# Patient Record
Sex: FEMALE | Race: WHITE | NOT HISPANIC OR LATINO | Employment: UNEMPLOYED | URBAN - METROPOLITAN AREA
[De-identification: names, ages, dates, MRNs, and addresses within clinical notes are randomized per-mention and may not be internally consistent; named-entity substitution may affect disease eponyms.]

---

## 2017-05-04 ENCOUNTER — TRANSCRIBE ORDERS (OUTPATIENT)
Dept: OBSTETRICS AND GYNECOLOGY | Facility: HOSPITAL | Age: 27
End: 2017-05-04

## 2017-05-04 DIAGNOSIS — I50.9 CONGESTIVE HEART FAILURE OF UNKNOWN ETIOLOGY (HCC): Primary | ICD-10-CM

## 2017-05-08 ENCOUNTER — APPOINTMENT (OUTPATIENT)
Dept: WOMENS IMAGING | Facility: HOSPITAL | Age: 27
End: 2017-05-08

## 2017-05-17 ENCOUNTER — APPOINTMENT (OUTPATIENT)
Dept: WOMENS IMAGING | Facility: HOSPITAL | Age: 27
End: 2017-05-17

## 2017-05-24 ENCOUNTER — APPOINTMENT (OUTPATIENT)
Dept: WOMENS IMAGING | Facility: HOSPITAL | Age: 27
End: 2017-05-24

## 2017-06-05 LAB
EXTERNAL ABO GROUPING: NORMAL
EXTERNAL ANTIBODY SCREEN: NEGATIVE
EXTERNAL RH FACTOR: NEGATIVE
EXTERNAL RUBELLA QUALITATIVE: NORMAL
EXTERNAL SYPHILIS RPR SCREEN: NORMAL
HIV1 P24 AG SERPL QL IA: NORMAL

## 2017-07-03 ENCOUNTER — TRANSCRIBE ORDERS (OUTPATIENT)
Dept: WOMENS IMAGING | Facility: HOSPITAL | Age: 27
End: 2017-07-03

## 2017-07-03 DIAGNOSIS — Z86.79: Primary | ICD-10-CM

## 2017-07-10 ENCOUNTER — APPOINTMENT (OUTPATIENT)
Dept: WOMENS IMAGING | Facility: HOSPITAL | Age: 27
End: 2017-07-10

## 2017-09-15 ENCOUNTER — TRANSCRIBE ORDERS (OUTPATIENT)
Dept: OBSTETRICS AND GYNECOLOGY | Facility: HOSPITAL | Age: 27
End: 2017-09-15

## 2017-09-15 DIAGNOSIS — I50.9 CONGESTIVE HEART FAILURE, UNSPECIFIED: Primary | ICD-10-CM

## 2017-09-25 ENCOUNTER — APPOINTMENT (OUTPATIENT)
Dept: WOMENS IMAGING | Facility: HOSPITAL | Age: 27
End: 2017-09-25

## 2017-10-09 ENCOUNTER — APPOINTMENT (OUTPATIENT)
Dept: WOMENS IMAGING | Facility: HOSPITAL | Age: 27
End: 2017-10-09

## 2017-10-26 LAB — EXTERNAL GROUP B STREP ANTIGEN: POSITIVE

## 2017-11-08 ENCOUNTER — ANESTHESIA EVENT (OUTPATIENT)
Dept: LABOR AND DELIVERY | Facility: HOSPITAL | Age: 27
End: 2017-11-08

## 2017-11-08 ENCOUNTER — ANESTHESIA (OUTPATIENT)
Dept: LABOR AND DELIVERY | Facility: HOSPITAL | Age: 27
End: 2017-11-08

## 2017-11-08 ENCOUNTER — HOSPITAL ENCOUNTER (INPATIENT)
Facility: HOSPITAL | Age: 27
LOS: 2 days | Discharge: HOME | End: 2017-11-10
Attending: OBSTETRICS & GYNECOLOGY | Admitting: OBSTETRICS & GYNECOLOGY

## 2017-11-08 DIAGNOSIS — Z98.891 H/O: CESAREAN SECTION: Primary | ICD-10-CM

## 2017-11-08 LAB
6-ACETYL MORPHINE: NEGATIVE
ABO GROUP BLD: NORMAL
AMPHET+METHAMPHET UR QL: NEGATIVE
BARBITURATES UR QL SCN: NEGATIVE
BENZODIAZ UR QL SCN: NEGATIVE
BLD GP AB SCN SERPL QL: NEGATIVE
BUPRENORPHINE SERPL-MCNC: POSITIVE NG/ML
CANNABINOIDS SERPL QL: NEGATIVE
COCAINE UR QL: NEGATIVE
DEPRECATED RDW RBC AUTO: 43.7 FL (ref 37–54)
ERYTHROCYTE [DISTWIDTH] IN BLOOD BY AUTOMATED COUNT: 13.9 % (ref 11.5–14.5)
HCT VFR BLD AUTO: 35.4 % (ref 37–47)
HGB BLD-MCNC: 12 G/DL (ref 12–16)
MCH RBC QN AUTO: 29.6 PG (ref 27–33)
MCHC RBC AUTO-ENTMCNC: 33.9 G/DL (ref 33–37)
MCV RBC AUTO: 87.4 FL (ref 80–94)
METHADONE UR QL SCN: NEGATIVE
OPIATES UR QL: NEGATIVE
OXYCODONE UR QL SCN: NEGATIVE
PCP UR QL SCN: NEGATIVE
PLATELET # BLD AUTO: 182 10*3/MM3 (ref 130–400)
PMV BLD AUTO: 11.4 FL (ref 6–10)
RBC # BLD AUTO: 4.05 10*6/MM3 (ref 4.2–5.4)
RH BLD: NEGATIVE
WBC NRBC COR # BLD: 13.71 10*3/MM3 (ref 4.5–12.5)

## 2017-11-08 PROCEDURE — 80307 DRUG TEST PRSMV CHEM ANLYZR: CPT | Performed by: OBSTETRICS & GYNECOLOGY

## 2017-11-08 PROCEDURE — 88302 TISSUE EXAM BY PATHOLOGIST: CPT | Performed by: OBSTETRICS & GYNECOLOGY

## 2017-11-08 PROCEDURE — 25010000003 CEFAZOLIN PER 500 MG: Performed by: ANESTHESIOLOGY

## 2017-11-08 PROCEDURE — 25010000002 KETOROLAC TROMETHAMINE PER 15 MG

## 2017-11-08 PROCEDURE — C1751 CATH, INF, PER/CENT/MIDLINE: HCPCS

## 2017-11-08 PROCEDURE — 86850 RBC ANTIBODY SCREEN: CPT | Performed by: OBSTETRICS & GYNECOLOGY

## 2017-11-08 PROCEDURE — 25010000002 MORPHINE PER 10 MG: Performed by: ANESTHESIOLOGY

## 2017-11-08 PROCEDURE — 85027 COMPLETE CBC AUTOMATED: CPT | Performed by: OBSTETRICS & GYNECOLOGY

## 2017-11-08 PROCEDURE — 25010000002 MIDAZOLAM PER 1 MG: Performed by: ANESTHESIOLOGY

## 2017-11-08 PROCEDURE — 59025 FETAL NON-STRESS TEST: CPT

## 2017-11-08 PROCEDURE — 86900 BLOOD TYPING SEROLOGIC ABO: CPT | Performed by: OBSTETRICS & GYNECOLOGY

## 2017-11-08 PROCEDURE — 94799 UNLISTED PULMONARY SVC/PX: CPT

## 2017-11-08 PROCEDURE — 0UB70ZZ EXCISION OF BILATERAL FALLOPIAN TUBES, OPEN APPROACH: ICD-10-PCS | Performed by: OBSTETRICS & GYNECOLOGY

## 2017-11-08 PROCEDURE — 86901 BLOOD TYPING SEROLOGIC RH(D): CPT | Performed by: OBSTETRICS & GYNECOLOGY

## 2017-11-08 RX ORDER — LIDOCAINE HYDROCHLORIDE 10 MG/ML
5 INJECTION, SOLUTION INFILTRATION; PERINEURAL AS NEEDED
Status: DISCONTINUED | OUTPATIENT
Start: 2017-11-08 | End: 2017-11-08 | Stop reason: HOSPADM

## 2017-11-08 RX ORDER — SODIUM CHLORIDE 0.9 % (FLUSH) 0.9 %
1-10 SYRINGE (ML) INJECTION AS NEEDED
Status: DISCONTINUED | OUTPATIENT
Start: 2017-11-08 | End: 2017-11-10 | Stop reason: HOSPADM

## 2017-11-08 RX ORDER — SODIUM CHLORIDE, SODIUM LACTATE, POTASSIUM CHLORIDE, CALCIUM CHLORIDE 600; 310; 30; 20 MG/100ML; MG/100ML; MG/100ML; MG/100ML
125 INJECTION, SOLUTION INTRAVENOUS CONTINUOUS
Status: DISCONTINUED | OUTPATIENT
Start: 2017-11-08 | End: 2017-11-08

## 2017-11-08 RX ORDER — DIPHENHYDRAMINE HCL 25 MG
25 CAPSULE ORAL EVERY 4 HOURS PRN
Status: DISCONTINUED | OUTPATIENT
Start: 2017-11-08 | End: 2017-11-10 | Stop reason: HOSPADM

## 2017-11-08 RX ORDER — OXYTOCIN/RINGER'S LACTATE 20/1000 ML
125 PLASTIC BAG, INJECTION (ML) INTRAVENOUS CONTINUOUS
Status: DISCONTINUED | OUTPATIENT
Start: 2017-11-08 | End: 2017-11-09

## 2017-11-08 RX ORDER — NALOXONE HCL 0.4 MG/ML
0.1 VIAL (ML) INJECTION
Status: ACTIVE | OUTPATIENT
Start: 2017-11-08 | End: 2017-11-09

## 2017-11-08 RX ORDER — MIDAZOLAM HYDROCHLORIDE 1 MG/ML
INJECTION INTRAMUSCULAR; INTRAVENOUS AS NEEDED
Status: DISCONTINUED | OUTPATIENT
Start: 2017-11-08 | End: 2017-11-08 | Stop reason: SURG

## 2017-11-08 RX ORDER — DIPHENHYDRAMINE HYDROCHLORIDE 50 MG/ML
25 INJECTION INTRAMUSCULAR; INTRAVENOUS EVERY 4 HOURS PRN
Status: DISCONTINUED | OUTPATIENT
Start: 2017-11-08 | End: 2017-11-10 | Stop reason: HOSPADM

## 2017-11-08 RX ORDER — ACETAMINOPHEN 160 MG/5ML
650 SOLUTION ORAL EVERY 4 HOURS PRN
Status: DISCONTINUED | OUTPATIENT
Start: 2017-11-08 | End: 2017-11-10 | Stop reason: HOSPADM

## 2017-11-08 RX ORDER — DOCUSATE SODIUM 100 MG/1
100 CAPSULE, LIQUID FILLED ORAL DAILY
Status: DISCONTINUED | OUTPATIENT
Start: 2017-11-09 | End: 2017-11-10 | Stop reason: HOSPADM

## 2017-11-08 RX ORDER — NICOTINE 21 MG/24HR
1 PATCH, TRANSDERMAL 24 HOURS TRANSDERMAL EVERY 24 HOURS
Status: DISCONTINUED | OUTPATIENT
Start: 2017-11-08 | End: 2017-11-10 | Stop reason: HOSPADM

## 2017-11-08 RX ORDER — MAGNESIUM HYDROXIDE 1200 MG/15ML
3000 LIQUID ORAL ONCE AS NEEDED
Status: DISCONTINUED | OUTPATIENT
Start: 2017-11-08 | End: 2017-11-08 | Stop reason: HOSPADM

## 2017-11-08 RX ORDER — PRENATAL VIT/IRON FUM/FOLIC AC 27MG-0.8MG
1 TABLET ORAL DAILY
Status: DISCONTINUED | OUTPATIENT
Start: 2017-11-09 | End: 2017-11-10 | Stop reason: HOSPADM

## 2017-11-08 RX ORDER — KETOROLAC TROMETHAMINE 30 MG/ML
INJECTION, SOLUTION INTRAMUSCULAR; INTRAVENOUS
Status: COMPLETED
Start: 2017-11-08 | End: 2017-11-08

## 2017-11-08 RX ORDER — SIMETHICONE 80 MG
80 TABLET,CHEWABLE ORAL 4 TIMES DAILY PRN
Status: DISCONTINUED | OUTPATIENT
Start: 2017-11-08 | End: 2017-11-10 | Stop reason: HOSPADM

## 2017-11-08 RX ORDER — OXYTOCIN/RINGER'S LACTATE 20/1000 ML
2 PLASTIC BAG, INJECTION (ML) INTRAVENOUS CONTINUOUS
Status: DISCONTINUED | OUTPATIENT
Start: 2017-11-08 | End: 2017-11-08

## 2017-11-08 RX ORDER — OXYTOCIN/RINGER'S LACTATE 20/1000 ML
PLASTIC BAG, INJECTION (ML) INTRAVENOUS
Status: DISCONTINUED
Start: 2017-11-08 | End: 2017-11-10 | Stop reason: HOSPADM

## 2017-11-08 RX ORDER — PRENATAL VIT/IRON FUM/FOLIC AC 27MG-0.8MG
1 TABLET ORAL DAILY
COMMUNITY

## 2017-11-08 RX ORDER — BUPRENORPHINE HYDROCHLORIDE 8 MG/1
8 TABLET SUBLINGUAL EVERY 12 HOURS SCHEDULED
COMMUNITY

## 2017-11-08 RX ORDER — SODIUM CHLORIDE 0.9 % (FLUSH) 0.9 %
10 SYRINGE (ML) INJECTION EVERY 12 HOURS SCHEDULED
Status: DISCONTINUED | OUTPATIENT
Start: 2017-11-08 | End: 2017-11-10 | Stop reason: HOSPADM

## 2017-11-08 RX ORDER — BISACODYL 5 MG/1
10 TABLET, DELAYED RELEASE ORAL DAILY PRN
Status: DISCONTINUED | OUTPATIENT
Start: 2017-11-08 | End: 2017-11-10 | Stop reason: HOSPADM

## 2017-11-08 RX ORDER — MISOPROSTOL 100 UG/1
800 TABLET ORAL AS NEEDED
Status: DISCONTINUED | OUTPATIENT
Start: 2017-11-08 | End: 2017-11-08 | Stop reason: HOSPADM

## 2017-11-08 RX ORDER — SODIUM CHLORIDE, SODIUM LACTATE, POTASSIUM CHLORIDE, CALCIUM CHLORIDE 600; 310; 30; 20 MG/100ML; MG/100ML; MG/100ML; MG/100ML
125 INJECTION, SOLUTION INTRAVENOUS CONTINUOUS
Status: DISCONTINUED | OUTPATIENT
Start: 2017-11-08 | End: 2017-11-09

## 2017-11-08 RX ORDER — MORPHINE SULFATE 1 MG/ML
INJECTION, SOLUTION EPIDURAL; INTRATHECAL; INTRAVENOUS AS NEEDED
Status: DISCONTINUED | OUTPATIENT
Start: 2017-11-08 | End: 2017-11-08 | Stop reason: SURG

## 2017-11-08 RX ORDER — SODIUM CHLORIDE 0.9 % (FLUSH) 0.9 %
1-10 SYRINGE (ML) INJECTION AS NEEDED
Status: DISCONTINUED | OUTPATIENT
Start: 2017-11-08 | End: 2017-11-08 | Stop reason: HOSPADM

## 2017-11-08 RX ORDER — METHYLERGONOVINE MALEATE 0.2 MG/ML
200 INJECTION INTRAVENOUS ONCE AS NEEDED
Status: DISCONTINUED | OUTPATIENT
Start: 2017-11-08 | End: 2017-11-08 | Stop reason: HOSPADM

## 2017-11-08 RX ORDER — HYDROCODONE BITARTRATE AND ACETAMINOPHEN 5; 325 MG/1; MG/1
1 TABLET ORAL EVERY 4 HOURS PRN
Status: DISCONTINUED | OUTPATIENT
Start: 2017-11-08 | End: 2017-11-10 | Stop reason: HOSPADM

## 2017-11-08 RX ORDER — SODIUM CHLORIDE 0.9 % (FLUSH) 0.9 %
10 SYRINGE (ML) INJECTION AS NEEDED
Status: DISCONTINUED | OUTPATIENT
Start: 2017-11-08 | End: 2017-11-10 | Stop reason: HOSPADM

## 2017-11-08 RX ORDER — BUPRENORPHINE 2 MG/1
8 TABLET SUBLINGUAL EVERY 12 HOURS SCHEDULED
Status: DISCONTINUED | OUTPATIENT
Start: 2017-11-08 | End: 2017-11-10 | Stop reason: HOSPADM

## 2017-11-08 RX ORDER — ONDANSETRON 2 MG/ML
4 INJECTION INTRAMUSCULAR; INTRAVENOUS ONCE AS NEEDED
Status: DISCONTINUED | OUTPATIENT
Start: 2017-11-08 | End: 2017-11-10 | Stop reason: HOSPADM

## 2017-11-08 RX ORDER — PROMETHAZINE HYDROCHLORIDE 25 MG/1
12.5 TABLET ORAL EVERY 4 HOURS PRN
Status: DISCONTINUED | OUTPATIENT
Start: 2017-11-08 | End: 2017-11-10 | Stop reason: HOSPADM

## 2017-11-08 RX ORDER — KETOROLAC TROMETHAMINE 30 MG/ML
30 INJECTION, SOLUTION INTRAMUSCULAR; INTRAVENOUS EVERY 6 HOURS PRN
Status: ACTIVE | OUTPATIENT
Start: 2017-11-08 | End: 2017-11-09

## 2017-11-08 RX ORDER — HYDROXYZINE 50 MG/1
50 TABLET, FILM COATED ORAL EVERY 6 HOURS PRN
Status: DISCONTINUED | OUTPATIENT
Start: 2017-11-08 | End: 2017-11-10 | Stop reason: HOSPADM

## 2017-11-08 RX ORDER — CARBOPROST TROMETHAMINE 250 UG/ML
250 INJECTION, SOLUTION INTRAMUSCULAR AS NEEDED
Status: DISCONTINUED | OUTPATIENT
Start: 2017-11-08 | End: 2017-11-08 | Stop reason: HOSPADM

## 2017-11-08 RX ORDER — ONDANSETRON 4 MG/1
4 TABLET, FILM COATED ORAL EVERY 8 HOURS PRN
Status: DISCONTINUED | OUTPATIENT
Start: 2017-11-08 | End: 2017-11-10 | Stop reason: HOSPADM

## 2017-11-08 RX ORDER — BUPRENORPHINE 2 MG/1
8 TABLET SUBLINGUAL
Status: ON HOLD | COMMUNITY
End: 2017-11-08

## 2017-11-08 RX ADMIN — KETOROLAC TROMETHAMINE 30 MG: 30 INJECTION, SOLUTION INTRAMUSCULAR; INTRAVENOUS at 17:51

## 2017-11-08 RX ADMIN — NICOTINE 1 PATCH: 14 PATCH TRANSDERMAL at 22:11

## 2017-11-08 RX ADMIN — MIDAZOLAM HYDROCHLORIDE 4 MG: 1 INJECTION, SOLUTION INTRAMUSCULAR; INTRAVENOUS at 16:22

## 2017-11-08 RX ADMIN — MORPHINE SULFATE 4.5 MG: 1 INJECTION, SOLUTION EPIDURAL; INTRATHECAL; INTRAVENOUS at 16:16

## 2017-11-08 RX ADMIN — SODIUM CHLORIDE, POTASSIUM CHLORIDE, SODIUM LACTATE AND CALCIUM CHLORIDE 125 ML/HR: 600; 310; 30; 20 INJECTION, SOLUTION INTRAVENOUS at 15:41

## 2017-11-08 RX ADMIN — SODIUM CHLORIDE, POTASSIUM CHLORIDE, SODIUM LACTATE AND CALCIUM CHLORIDE 1000 ML: 600; 310; 30; 20 INJECTION, SOLUTION INTRAVENOUS at 15:09

## 2017-11-08 RX ADMIN — MORPHINE SULFATE 0.5 MG: 1 INJECTION, SOLUTION EPIDURAL; INTRATHECAL; INTRAVENOUS at 16:14

## 2017-11-08 RX ADMIN — KETOROLAC TROMETHAMINE 30 MG: 30 INJECTION, SOLUTION INTRAMUSCULAR at 17:51

## 2017-11-08 RX ADMIN — BUPRENORPHINE HCL 8 MG: 2 TABLET SUBLINGUAL at 21:10

## 2017-11-08 RX ADMIN — CEFAZOLIN SODIUM 2 G: 2 SOLUTION INTRAVENOUS at 15:59

## 2017-11-08 NOTE — H&P
HISTORY    Chief complaint  Term pregnancy, previous  section, desire for sterilization    History of present illness  This is a 27 year old patient who is admitted to the hospital with a term pregnancy.  She has had a previous  section and she wishes to have tubal sterilization.    Past medical, surgical, obstetrical history  See attached prenatal record    Family history  See attached prenatal record    Social history  See attached prenatal record    Functional inquiry  See attached prenatal record                             PHYSICAL EXAMINATION    General  In no acute distress    HEENT  Throat and ears are clear, no masses or nodes were palpable    CVR  Heart sounds are normal with no murmurs, chest is clear to IPPA    GI/  Abdomen is soft with no masses tenderness or organomegaly.  Uterine size is commensurate with her dates.  Cervix is as noted elsewhere in chart    MS  No gross bone or joint abnormalities.                                        IMPRESSION/TREATMENT PLAN  Term pregnancy, previous  section, desire for sterilization    Repeat low segment transverse  section with tubal sterilization.    I explained that when we do a  section, we usually use a regional anesthetic.  We will prep and drape her in the usual fashion.  A catheter will be put in her bladder.  We will make her incision, do the  section, and closed her with subcuticular Vicryl and Steri-Strips.  I explained that she will be in the hospital about 2 days.  She understands that the risks and complications of  section include but are not limited to risk of anesthesia, bleeding, infection, damage to the bowels, damage to the ureters and bladder, surgery to repair anything damage, and dehiscence of her incision.  She has no further questions, and she is informed, able to give informed consent.    I explained that when we do a tubal sterilization  at  section, we draw up a loop of fallopian tube, suture ligate this with plain suture, cut off the end of the loop, and cauterize both ends.  She understands that the suture will dissolve, the ends of the tube will scar shut, so that sperm cannot get up and eggs cannot get down.  She does understand also that tubal sterilization is extremely effective in preventing pregnancy, but nothing is perfect and there is a small chance of pregnancy afterwards.  She understands and accepts that.

## 2017-11-08 NOTE — ANESTHESIA POSTPROCEDURE EVALUATION
Patient: Mitchell Jay    Procedure Summary     Date Anesthesia Start Anesthesia Stop Room / Location    17 1540 1630  [facility] /  [facility]       Procedure Diagnosis Surgeon Provider     SECTION REPEAT WITH TUBAL (Bilateral Abdomen) (Repeat CesareanSection with a Bilateral tubal ligation) MD Justin Peraza MD          Anesthesia Type: spinal  Last vitals  BP   122/77 (17 1655)   Temp   97.6 °F (36.4 °C) (17 1640)   Pulse   88 (17 1655)   Resp   14 (17 165)     SpO2   99 % (17 165)     Post Anesthesia Care and Evaluation    Patient location during evaluation: bedside  Patient participation: complete - patient participated  Level of consciousness: awake and alert  Pain score: 1  Pain management: adequate  Airway patency: patent  Anesthetic complications: No anesthetic complications  PONV Status: none  Cardiovascular status: acceptable  Respiratory status: acceptable  Hydration status: acceptable

## 2017-11-08 NOTE — L&D DELIVERY NOTE
Preoperative diagnosis: Term pregnancy, previous  section, desire for sterilization    Postoperative diagnosis: Same as above    Procedure performed: Repeat low segment transverse  section with bilateral tubal sterilization    Specimens removed: Segments of right and left fallopian tubes    Anesthesia: Spinal    Surgeon: Dr. Jonas Wray    Assistant: None    Estimated blood loss: 250 cc    Blood products: None    Grafts/implants: None    Complications: None    Description of the procedure:This patient was taken to the operating room, placed on the operating table, and given an excellent regional anesthetic.  She was prepped and draped in the usual fashion for  section.  The abdomen was opened through a lower abdominal transverse incision, and the bladder peritoneum was divided.  The bladder was pushed down out of the operative field, and the uterus was entered with sharp scalpel dissection and blunt finger/hemostat entry.  The baby was delivered in the vertex position.  Mouth and nares were suctioned prior to the first breath.  The baby was handed off to the awaiting pediatric staff. Apgars were good.  The placenta was delivered in its entirety.  The uterus was checked and found to be free of retained placental fragments, and the uterine musculature was closed in a single layered Lembert fashion, and the posterior peritoneum was closed.      Attention was then turned to tubal sterilization.  The fimbriated end on the right side was identified.  A loop of tube in the midsection was drawn up, and doubly suture ligatured with heavy plain suture.  The loop was excised, and the ends were cauterized.  The specimen was sent to pathology labeled as such.  The same procedure was carried out on the left side.  After the uterus was placed back in the peritoneal cavity, we took a Oleary retractor and checked both tubal stumps to make sure that the sutures were still in place.    Sponge, needle, and  instrument counts were correct both numerically and visually ×2, and the anterior peritoneum was closed with Vicryl, the fascia with a running suture of heavy Vicryl, and the skin with a subcuticular stitch of Vicryl and Steri-Strips.  The patient tolerated her procedure well, and she was returned to the postanesthetic recovery room in satisfactory condition with clear urine.    Significant points about this procedure: Extremely little blood loss.  Surgery went well.

## 2017-11-08 NOTE — NON STRESS TEST
Mitchell Jay, a  at 39w0d with an KVNG of 11/15/2017, by Ultrasound, was seen at [facility]OR DELIVERY for a nonstress test.    Chief Complaint   Patient presents with   • Scheduled        Interpretation A  Nonstress Test Interpretation A: Reactive (17 1300 : Nita Chambers, RN)  Comments A: verified by toney saini rn (17 1300 : Nita Chambers, RN)

## 2017-11-08 NOTE — ANESTHESIA PROCEDURE NOTES
Intrathecal Block    Patient location during procedure: OB  Start Time: 11/8/2017 3:50 PM  Stop Time: 11/8/2017 3:52 PM  Indication:at surgeon's request  Performed By  Anesthesiologist: NADINE JOYNER  Preanesthetic Checklist  Completed: patient identified, site marked, surgical consent, pre-op evaluation, timeout performed, IV checked, risks and benefits discussed and monitors and equipment checked  Intrathecal Block Prep:  Pt Position:sitting  Sterile Tech:sterile barrier, gloves and cap  Prep:chloraprep  Monitoring:blood pressure monitoring, continuous pulse oximetry and EKG  Intrathecal Block Procedure:  Approach:midline  Location:L3-L4  Needle Type:Quincke  Needle Gauge:24 G  Placement of Needle Event: cerebrospinal fluid  Fluid Appearance:clear  Post Assessment:  Patient Tolerance:patient tolerated the procedure well with no apparent complications  Complications:no

## 2017-11-08 NOTE — PLAN OF CARE
Problem: Patient Care Overview (Adult)  Goal: Plan of Care Review  Outcome: Ongoing (interventions implemented as appropriate)    17 5340   Coping/Psychosocial Response Interventions   Plan Of Care Reviewed With patient   Patient Care Overview   Progress improving   Outcome Evaluation   Outcome Summary/Follow up Plan Fundus firm, small rubra bleeding. Pressure dressing intact. F/C to bsd. No complaints at this time.       Goal: Adult Individualization and Mutuality  Outcome: Ongoing (interventions implemented as appropriate)  Goal: Discharge Needs Assessment  Outcome: Ongoing (interventions implemented as appropriate)    Problem: Postpartum ( Delivery) (Adult)  Goal: Signs and Symptoms of Listed Potential Problems Will be Absent or Manageable (Postpartum)  Outcome: Ongoing (interventions implemented as appropriate)

## 2017-11-08 NOTE — ANESTHESIA PREPROCEDURE EVALUATION
Anesthesia Evaluation     no history of anesthetic complications:  NPO Solid Status: > 8 hours  NPO Liquid Status: > 8 hours     Airway   Mallampati: II  TM distance: >3 FB  Neck ROM: full  no difficulty expected  Dental    (+) poor dentition    Pulmonary - normal exam   Cardiovascular - normal exam    (+) valvular problems/murmurs,       Neuro/Psych  GI/Hepatic/Renal/Endo      Musculoskeletal     Abdominal  - normal exam   Substance History   (+) drug use     OB/GYN          Other                                        Anesthesia Plan    ASA 3     Anesthetic plan and risks discussed with patient.

## 2017-11-09 LAB
ABO GROUP BLD: NORMAL
BURR CELLS BLD QL SMEAR: ABNORMAL
DEPRECATED RDW RBC AUTO: 45.5 FL (ref 37–54)
ERYTHROCYTE [DISTWIDTH] IN BLOOD BY AUTOMATED COUNT: 14.5 % (ref 11.5–14.5)
FETAL BLEED: NEGATIVE
HCT VFR BLD AUTO: 31.9 % (ref 37–47)
HGB BLD-MCNC: 10.7 G/DL (ref 12–16)
LYMPHOCYTES # BLD MANUAL: 9.42 10*3/MM3 (ref 1–3)
LYMPHOCYTES NFR BLD MANUAL: 4 % (ref 0–10)
LYMPHOCYTES NFR BLD MANUAL: 51 % (ref 21–51)
MCH RBC QN AUTO: 29.2 PG (ref 27–33)
MCHC RBC AUTO-ENTMCNC: 33.5 G/DL (ref 33–37)
MCV RBC AUTO: 87.2 FL (ref 80–94)
MONOCYTES # BLD AUTO: 0.74 10*3/MM3 (ref 0.1–0.9)
NEUTROPHILS # BLD AUTO: 8.32 10*3/MM3 (ref 1.4–6.5)
NEUTROPHILS NFR BLD MANUAL: 44 % (ref 30–70)
NEUTS BAND NFR BLD MANUAL: 1 % (ref 4–12)
NUMBER OF DOSES: NORMAL
PLAT MORPH BLD: NORMAL
PLATELET # BLD AUTO: 173 10*3/MM3 (ref 130–400)
PMV BLD AUTO: 11.4 FL (ref 6–10)
RBC # BLD AUTO: 3.66 10*6/MM3 (ref 4.2–5.4)
RH BLD: NEGATIVE
SCAN SLIDE: NORMAL
WBC NRBC COR # BLD: 18.48 10*3/MM3 (ref 4.5–12.5)

## 2017-11-09 PROCEDURE — 86900 BLOOD TYPING SEROLOGIC ABO: CPT | Performed by: OBSTETRICS & GYNECOLOGY

## 2017-11-09 PROCEDURE — 94799 UNLISTED PULMONARY SVC/PX: CPT

## 2017-11-09 PROCEDURE — 85025 COMPLETE CBC W/AUTO DIFF WBC: CPT | Performed by: OBSTETRICS & GYNECOLOGY

## 2017-11-09 PROCEDURE — 85461 HEMOGLOBIN FETAL: CPT | Performed by: OBSTETRICS & GYNECOLOGY

## 2017-11-09 PROCEDURE — 86901 BLOOD TYPING SEROLOGIC RH(D): CPT | Performed by: OBSTETRICS & GYNECOLOGY

## 2017-11-09 PROCEDURE — 85007 BL SMEAR W/DIFF WBC COUNT: CPT | Performed by: OBSTETRICS & GYNECOLOGY

## 2017-11-09 RX ORDER — IBUPROFEN 800 MG/1
800 TABLET ORAL EVERY 8 HOURS SCHEDULED
Status: DISCONTINUED | OUTPATIENT
Start: 2017-11-09 | End: 2017-11-10 | Stop reason: HOSPADM

## 2017-11-09 RX ADMIN — BUPRENORPHINE HCL 8 MG: 2 TABLET SUBLINGUAL at 20:58

## 2017-11-09 RX ADMIN — BUPRENORPHINE HCL 8 MG: 2 TABLET SUBLINGUAL at 09:24

## 2017-11-09 RX ADMIN — Medication 10 ML: at 09:23

## 2017-11-09 RX ADMIN — PRENATAL VIT W/ FE FUMARATE-FA TAB 27-0.8 MG 1 TABLET: 27-0.8 TAB at 09:24

## 2017-11-09 RX ADMIN — Medication 10 ML: at 20:59

## 2017-11-09 RX ADMIN — IBUPROFEN 800 MG: 800 TABLET ORAL at 20:58

## 2017-11-09 RX ADMIN — IBUPROFEN 800 MG: 800 TABLET ORAL at 13:56

## 2017-11-09 RX ADMIN — DOCUSATE SODIUM 100 MG: 100 CAPSULE, LIQUID FILLED ORAL at 09:24

## 2017-11-09 NOTE — PROGRESS NOTES
This patient underwent repeat low segment transverse  section with tubal sterilization yesterday.  She delivered a healthy male baby.  Her vital signs are stable and her hemoglobin is 13.7.  She is up, walking around, doing well.  Baby is in the room with her.  Look toward dismissal tomorrow.

## 2017-11-09 NOTE — PAYOR COMM NOTE
"CONTACT:  JAYDA BRUCE RN, BSN  UTILIZATION MANAGEMENT DEPT.  [facility]  [address], [address]  PHONE:  [phone]  FAX: [phone]    REQUEST FOR INPATIENT DELIVERY AUTHORIZATION.    PT ADMITTED 17 FOR , DELIVERED 17 VIA , WELL BABY BOY, REGULAR NURSERY, WEIGHT 3086 GRAMS, APGARS 9/9, EDC 11/15/17, GA 39/0, .    Mitchell Monahan (27 y.o. Female)     Date of Birth Social Security Number Address Home Phone MRN    1990  [address] [phone] [id number]    [employer] Mar[employer] Status          None Single       Admission Date Admission Type Admitting Provider Attending Provider Department, Room/Bed    17 Elective Jonas Wray MD Cass, Roger Leroy, MD [facility], W245/    Discharge Date Discharge Disposition Discharge Destination                      Attending Provider: Jonas Wray MD     Allergies:  Sulfamethoxazole, Trimethoprim    Isolation:  None   Infection:  None   Code Status:  FULL    Ht:  68\" (172.7 cm)   Wt:  142 lb (64.4 kg)    Admission Cmt:  None   Principal Problem:  None                Active Insurance as of 2017     Primary Coverage     Payor Plan Insurance Group Employer/Plan Group    PASSPORT PASSPORT MEDICAID     Payor Plan Address Payor Plan Phone Number Effective From Effective To    [address] [phone] 2015     [place] [address]       Subscriber Name Subscriber Birth Date Member ID       MITCHELL MONAHAN 1990 [id number]                 Emergency Contacts      (Rel.) Home Phone Work Phone Mobile Phone    Contact,No [phone] -- --               History & Physical      H&P filed by Thomas Dietz MD at 2017 10:33 AM      Scan on 2017 : [facility]  2017 (below)              Electronically signed by Interface, Scans Incoming at 2017 10:33 AM      Jonas Wray MD at 2017  2:03 PM               "                                       HISTORY    Chief complaint  Term pregnancy, previous  section, desire for sterilization    History of present illness  This is a 27 year old patient who is admitted to the hospital with a term pregnancy.  She has had a previous  section and she wishes to have tubal sterilization.    Past medical, surgical, obstetrical history  See attached prenatal record    Family history  See attached prenatal record    Social history  See attached prenatal record    Functional inquiry  See attached prenatal record                             PHYSICAL EXAMINATION    General  In no acute distress    HEENT  Throat and ears are clear, no masses or nodes were palpable    CVR  Heart sounds are normal with no murmurs, chest is clear to IPPA    GI/  Abdomen is soft with no masses tenderness or organomegaly.  Uterine size is commensurate with her dates.  Cervix is as noted elsewhere in chart    MS  No gross bone or joint abnormalities.                                        IMPRESSION/TREATMENT PLAN  Term pregnancy, previous  section, desire for sterilization    Repeat low segment transverse  section with tubal sterilization.    I explained that when we do a  section, we usually use a regional anesthetic.  We will prep and drape her in the usual fashion.  A catheter will be put in her bladder.  We will make her incision, do the  section, and closed her with subcuticular Vicryl and Steri-Strips.  I explained that she will be in the hospital about 2 days.  She understands that the risks and complications of  section include but are not limited to risk of anesthesia, bleeding, infection, damage to the bowels, damage to the ureters and bladder, surgery to repair anything damage, and dehiscence of her incision.  She has no further questions, and she is informed, able to give informed consent.    I explained that when we do a tubal sterilization at   section, we draw up a loop of fallopian tube, suture ligate this with plain suture, cut off the end of the loop, and cauterize both ends.  She understands that the suture will dissolve, the ends of the tube will scar shut, so that sperm cannot get up and eggs cannot get down.  She does understand also that tubal sterilization is extremely effective in preventing pregnancy, but nothing is perfect and there is a small chance of pregnancy afterwards.  She understands and accepts that.       Electronically signed by Jonas Wray MD at 2017  2:03 PM           Operative/Procedure Notes (all)      Jonas Wray MD at 2017  4:31 PM  Version 1 of 1         Preoperative diagnosis: Term pregnancy, previous  section, desire for sterilization    Postoperative diagnosis: Same as above    Procedure performed: Repeat low segment transverse  section with bilateral tubal sterilization    Specimens removed: Segments of right and left fallopian tubes    Anesthesia: Spinal    Surgeon: Dr. Jonas Wray    Assistant: None    Estimated blood loss: 250 cc    Blood products: None    Grafts/implants: None    Complications: None    Description of the procedure:This patient was taken to the operating room, placed on the operating table, and given an excellent regional anesthetic.  She was prepped and draped in the usual fashion for  section.  The abdomen was opened through a lower abdominal transverse incision, and the bladder peritoneum was divided.  The bladder was pushed down out of the operative field, and the uterus was entered with sharp scalpel dissection and blunt finger/hemostat entry.  The baby was delivered in the vertex position.  Mouth and nares were suctioned prior to the first breath.  The baby was handed off to the awaiting pediatric staff. Apgars were good.  The placenta was delivered in its entirety.  The uterus was checked and found to be free of retained placental fragments,  and the uterine musculature was closed in a single layered Lembert fashion, and the posterior peritoneum was closed.      Attention was then turned to tubal sterilization.  The fimbriated end on the right side was identified.  A loop of tube in the midsection was drawn up, and doubly suture ligatured with heavy plain suture.  The loop was excised, and the ends were cauterized.  The specimen was sent to pathology labeled as such.  The same procedure was carried out on the left side.  After the uterus was placed back in the peritoneal cavity, we took a Oleary retractor and checked both tubal stumps to make sure that the sutures were still in place.    Sponge, needle, and instrument counts were correct both numerically and visually ×2, and the anterior peritoneum was closed with Vicryl, the fascia with a running suture of heavy Vicryl, and the skin with a subcuticular stitch of Vicryl and Steri-Strips.  The patient tolerated her procedure well, and she was returned to the postanesthetic recovery room in satisfactory condition with clear urine.    Significant points about this procedure: Extremely little blood loss.  Surgery went well.     Electronically signed by Jonas Wray MD at 2017  4:31 PM      Jonas Wray MD at 2017  4:32 PM  Version 1 of 1         Preoperative diagnosis: Term pregnancy, previous  section, desire for sterilization    Postoperative diagnosis: Same as above    Procedure performed: Repeat low segment transverse  section with bilateral tubal sterilization    Specimens removed: Segments of right and left fallopian tubes    Anesthesia: Spinal    Surgeon: Dr. Jonas Wray    Assistant: None    Estimated blood loss: 250 cc    Blood products: None    Grafts/implants: None    Complications: None    Description of the procedure:This patient was taken to the operating room, placed on the operating table, and given an excellent regional anesthetic.  She was prepped and  draped in the usual fashion for  section.  The abdomen was opened through a lower abdominal transverse incision, and the bladder peritoneum was divided.  The bladder was pushed down out of the operative field, and the uterus was entered with sharp scalpel dissection and blunt finger/hemostat entry.  The baby was delivered in the vertex position.  Mouth and nares were suctioned prior to the first breath.  The baby was handed off to the awaiting pediatric staff. Apgars were good.  The placenta was delivered in its entirety.  The uterus was checked and found to be free of retained placental fragments, and the uterine musculature was closed in a single layered Lembert fashion, and the posterior peritoneum was closed.      Attention was then turned to tubal sterilization.  The fimbriated end on the right side was identified.  A loop of tube in the midsection was drawn up, and doubly suture ligatured with heavy plain suture.  The loop was excised, and the ends were cauterized.  The specimen was sent to pathology labeled as such.  The same procedure was carried out on the left side.  After the uterus was placed back in the peritoneal cavity, we took a Oleary retractor and checked both tubal stumps to make sure that the sutures were still in place.    Sponge, needle, and instrument counts were correct both numerically and visually ×2, and the anterior peritoneum was closed with Vicryl, the fascia with a running suture of heavy Vicryl, and the skin with a subcuticular stitch of Vicryl and Steri-Strips.  The patient tolerated her procedure well, and she was returned to the postanesthetic recovery room in satisfactory condition with clear urine.    Significant points about this procedure: Extremely little blood loss.  Surgery went well.     Electronically signed by Jonas Wray MD at 2017  4:32 PM           Physician Progress Notes (all)      Jonas Wray MD at 2017  8:24 AM  Version 1 of 1          This patient underwent repeat low segment transverse  section with tubal sterilization yesterday.  She delivered a healthy male baby.  Her vital signs are stable and her hemoglobin is 13.7.  She is up, walking around, doing well.  Baby is in the room with her.  Look toward dismissal tomorrow.     Electronically signed by Jonas Wray MD at 2017  8:24 AM

## 2017-11-09 NOTE — PLAN OF CARE
Problem: Patient Care Overview (Adult)  Goal: Plan of Care Review  Outcome: Ongoing (interventions implemented as appropriate)    17 1415   Coping/Psychosocial Response Interventions   Plan Of Care Reviewed With patient   Patient Care Overview   Progress improving   Outcome Evaluation   Outcome Summary/Follow up Plan Fundus firm, small rubra bleeding noted. Steri-strips intact over incision. Pt voiding without difficulty. No complaints at this time.       Goal: Adult Individualization and Mutuality  Outcome: Ongoing (interventions implemented as appropriate)  Goal: Discharge Needs Assessment  Outcome: Ongoing (interventions implemented as appropriate)    Problem: Postpartum ( Delivery) (Adult)  Goal: Signs and Symptoms of Listed Potential Problems Will be Absent or Manageable (Postpartum)  Outcome: Ongoing (interventions implemented as appropriate)

## 2017-11-09 NOTE — PROGRESS NOTES
"Case Management/Social Work    Patient Name:  Mitchell Jay  YOB: 1990  MRN: [id number]  Admit Date:  11/8/2017    [facility] received consult \"mom history of drugs.\" Pt is 28 Y/O Mitchell Jay who delivered viable baby boy on 11/8/17 weighing 6 lbs. 12 oz. Infant was named Angela Jay. FOB is Mukesh Jay who is involved. Pt has 3 other children; Afia Comer, age 9, Fidencio Jay, age 5, and Venkatesh, age 1. Pt states her sister in law has custody of her children. Pt lives at [address] [place] [address] in [place], [place]. Pt lives in the home with FOB. Pt utilizes SNAP benefits. Pt states she plans to sign up to receive WIC. Pt does not utilize the HANDS program. Pt to sign infant up to receive Medicaid. Infant care supplies available including the car seat.     Pt and infant's UDS results are positive for Buprenorphine. Pt has a valid prescription for Buprenorphine per [facility] report. Infant's CordStat results are pending.     Pt has a history with [facility]. [facility] SS contacted Central Intake and report was accepted for investigation. [facility] to provide infant's discharge plan when available.     [facility] to provide transportation at discharge.     [facility] SS to be contacted with positive CordStat results or any other issues or concerns.     Electronically signed by:  Lela Ramirez  11/09/17 12:27 PM  "

## 2017-11-09 NOTE — PLAN OF CARE
Problem: Patient Care Overview (Adult)  Goal: Plan of Care Review  Outcome: Ongoing (interventions implemented as appropriate)    17 0552   Coping/Psychosocial Response Interventions   Plan Of Care Reviewed With patient   Patient Care Overview   Progress progress toward functional goals as expected         Problem: Postpartum ( Delivery) (Adult)  Goal: Signs and Symptoms of Listed Potential Problems Will be Absent or Manageable (Postpartum)  Outcome: Ongoing (interventions implemented as appropriate)    17 0552   Postpartum ( Delivery)   Problems Assessed (Postpartum ) all   Problems Present (Postpartum ) none

## 2017-11-10 VITALS
SYSTOLIC BLOOD PRESSURE: 116 MMHG | HEART RATE: 76 BPM | TEMPERATURE: 98 F | OXYGEN SATURATION: 98 % | WEIGHT: 142 LBS | HEIGHT: 68 IN | RESPIRATION RATE: 18 BRPM | DIASTOLIC BLOOD PRESSURE: 67 MMHG | BODY MASS INDEX: 21.52 KG/M2

## 2017-11-10 LAB
BURR CELLS BLD QL SMEAR: ABNORMAL
DEPRECATED RDW RBC AUTO: 44.4 FL (ref 37–54)
ERYTHROCYTE [DISTWIDTH] IN BLOOD BY AUTOMATED COUNT: 14.6 % (ref 11.5–14.5)
HCT VFR BLD AUTO: 33.1 % (ref 37–47)
HGB BLD-MCNC: 11.1 G/DL (ref 12–16)
LAB AP CASE REPORT: NORMAL
LYMPHOCYTES # BLD MANUAL: 18.58 10*3/MM3 (ref 1–3)
LYMPHOCYTES NFR BLD MANUAL: 5 % (ref 0–10)
LYMPHOCYTES NFR BLD MANUAL: 67 % (ref 21–51)
Lab: NORMAL
MCH RBC QN AUTO: 29.2 PG (ref 27–33)
MCHC RBC AUTO-ENTMCNC: 33.5 G/DL (ref 33–37)
MCV RBC AUTO: 87.1 FL (ref 80–94)
MONOCYTES # BLD AUTO: 1.39 10*3/MM3 (ref 0.1–0.9)
NEUTROPHILS # BLD AUTO: 7.76 10*3/MM3 (ref 1.4–6.5)
NEUTROPHILS NFR BLD MANUAL: 25 % (ref 30–70)
NEUTS BAND NFR BLD MANUAL: 3 % (ref 4–12)
PATH REPORT.FINAL DX SPEC: NORMAL
PLAT MORPH BLD: NORMAL
PLATELET # BLD AUTO: 216 10*3/MM3 (ref 130–400)
PMV BLD AUTO: 11.9 FL (ref 6–10)
RBC # BLD AUTO: 3.8 10*6/MM3 (ref 4.2–5.4)
SCAN SLIDE: NORMAL
WBC NRBC COR # BLD: 27.73 10*3/MM3 (ref 4.5–12.5)

## 2017-11-10 PROCEDURE — 85007 BL SMEAR W/DIFF WBC COUNT: CPT | Performed by: OBSTETRICS & GYNECOLOGY

## 2017-11-10 PROCEDURE — 85025 COMPLETE CBC W/AUTO DIFF WBC: CPT | Performed by: OBSTETRICS & GYNECOLOGY

## 2017-11-10 PROCEDURE — 94799 UNLISTED PULMONARY SVC/PX: CPT

## 2017-11-10 PROCEDURE — 25010000003 RHO D IMMUNE GLOBULIN 1500 UNITS SOLUTION PREFILLED SYRINGE: Performed by: OBSTETRICS & GYNECOLOGY

## 2017-11-10 RX ORDER — HYDROCODONE BITARTRATE AND ACETAMINOPHEN 5; 325 MG/1; MG/1
1 TABLET ORAL EVERY 4 HOURS PRN
Qty: 30 TABLET | Refills: 0 | Status: SHIPPED | OUTPATIENT
Start: 2017-11-10 | End: 2017-11-20

## 2017-11-10 RX ORDER — DOCUSATE SODIUM 100 MG/1
100 CAPSULE, LIQUID FILLED ORAL 2 TIMES DAILY
Qty: 60 CAPSULE | Refills: 0 | Status: SHIPPED | OUTPATIENT
Start: 2017-11-10 | End: 2017-12-10

## 2017-11-10 RX ORDER — IBUPROFEN 600 MG/1
600 TABLET ORAL EVERY 6 HOURS PRN
Qty: 30 TABLET | Refills: 0 | Status: SHIPPED | OUTPATIENT
Start: 2017-11-10 | End: 2017-12-10

## 2017-11-10 RX ADMIN — IBUPROFEN 800 MG: 800 TABLET ORAL at 05:19

## 2017-11-10 RX ADMIN — PRENATAL VIT W/ FE FUMARATE-FA TAB 27-0.8 MG 1 TABLET: 27-0.8 TAB at 08:54

## 2017-11-10 RX ADMIN — Medication 10 ML: at 08:52

## 2017-11-10 RX ADMIN — BUPRENORPHINE HCL 8 MG: 2 TABLET SUBLINGUAL at 08:54

## 2017-11-10 RX ADMIN — HUMAN RHO(D) IMMUNE GLOBULIN 300 MCG: 300 INJECTION, SOLUTION INTRAMUSCULAR at 11:57

## 2017-11-10 RX ADMIN — DOCUSATE SODIUM 100 MG: 100 CAPSULE, LIQUID FILLED ORAL at 08:54

## 2017-11-10 NOTE — PLAN OF CARE
Problem: Patient Care Overview (Adult)  Goal: Plan of Care Review  Outcome: Ongoing (interventions implemented as appropriate)    11/10/17 0549   Coping/Psychosocial Response Interventions   Plan Of Care Reviewed With patient   Patient Care Overview   Progress progress toward functional goals as expected         Problem: Postpartum ( Delivery) (Adult)  Goal: Signs and Symptoms of Listed Potential Problems Will be Absent or Manageable (Postpartum)  Outcome: Ongoing (interventions implemented as appropriate)    11/10/17 0549   Postpartum ( Delivery)   Problems Assessed (Postpartum ) all   Problems Present (Postpartum ) none

## 2017-11-11 NOTE — PAYOR COMM NOTE
"[facility]   GURINDER HDZ  PHONE  [phone]  FAX  [phone]    PATIENT D/C 11/10/17    Vlad Jay (27 y.o. Female)     Date of Birth Social Security Number Address Home Phone MRN    1990  [address] [id number] [phone] [id number]    [employer] Mar[employer] Status          None Single       Admission Date Admission Type Admitting Provider Attending Provider Department, Room/Bed    11/8/17 Elective Jonas Wray MD  [facility], W245/1    Discharge Date Discharge Disposition Discharge Destination        11/10/2017 Home or Self Care             Attending Provider: (none)    Allergies:  Sulfamethoxazole, Trimethoprim    Isolation:  None   Infection:  None   Code Status:  Prior    Ht:  68\" (172.7 cm)   Wt:  142 lb (64.4 kg)    Admission Cmt:  None   Principal Problem:  None                Active Insurance as of 11/8/2017     Primary Coverage     Payor Plan Insurance Group Employer/Plan Group    PASSPORT PASSPORT MEDICAID     Payor Plan Address Payor Plan Phone Number Effective From Effective To    [address] [phone] 1/1/2015     [place] [address]       Subscriber Name Subscriber Birth Date Member ID       VLAD JAY 1990 [id number]                 Emergency Contacts      (Rel.) Home Phone Work Phone Mobile Phone    Contact,No [phone] -- --              "

## 2019-09-23 NOTE — PROGRESS NOTES
Case Management/Social Work    Patient Name:  Mitchell Jay  YOB: 1990  MRN: [id number]  Admit Date:  11/8/2017      SS received call via on call pager regarding positive UDS.  SS spoke with Melissa in the nursery and she states the pt is positive for Buprenorphine and the infant is also positive.  SS reviewed the pt's H&P from [facility] and the pt is prescribed Buprenorphine.  SS made a referral to the Central Intake Hotline on this date.  Intake ID# [id number].  Referral will be reviewed for possible acceptance.  SS will follow-up in the AM.      Electronically signed by:  Erendira Rneee  11/08/17 6:57 PM   Patient called and asks that the order for the dexa scan that was supposed to be entered today please be sent to the [facility]. Please call patient once it has been sent so patient is aware.

## 2020-03-05 ENCOUNTER — OFFICE VISIT (OUTPATIENT)
Dept: CARDIOLOGY | Facility: CLINIC | Age: 30
End: 2020-03-05

## 2020-03-05 VITALS
SYSTOLIC BLOOD PRESSURE: 111 MMHG | HEIGHT: 68 IN | WEIGHT: 121 LBS | BODY MASS INDEX: 18.34 KG/M2 | DIASTOLIC BLOOD PRESSURE: 68 MMHG | HEART RATE: 63 BPM | OXYGEN SATURATION: 98 %

## 2020-03-05 DIAGNOSIS — Z01.810 PRE-OPERATIVE CARDIOVASCULAR EXAMINATION: Primary | ICD-10-CM

## 2020-03-05 DIAGNOSIS — Z95.2 HISTORY OF PROSTHETIC AORTIC VALVE: ICD-10-CM

## 2020-03-05 DIAGNOSIS — R06.02 SHORTNESS OF BREATH: ICD-10-CM

## 2020-03-05 DIAGNOSIS — F17.210 CIGARETTE SMOKER: ICD-10-CM

## 2020-03-05 DIAGNOSIS — R07.2 PRECORDIAL PAIN: ICD-10-CM

## 2020-03-05 PROCEDURE — 93000 ELECTROCARDIOGRAM COMPLETE: CPT | Performed by: SPECIALIST

## 2020-03-05 PROCEDURE — 99204 OFFICE O/P NEW MOD 45 MIN: CPT | Performed by: SPECIALIST

## 2020-03-05 RX ORDER — AMOXICILLIN 500 MG/1
CAPSULE ORAL
COMMUNITY
Start: 2020-01-28

## 2020-03-05 RX ORDER — BUPRENORPHINE HYDROCHLORIDE AND NALOXONE HYDROCHLORIDE DIHYDRATE 8; 2 MG/1; MG/1
TABLET SUBLINGUAL
COMMUNITY
Start: 2020-02-18

## 2020-03-05 NOTE — PROGRESS NOTES
Subjective   Initial consultation, preoperative cardiac risk assessment  Mitchell Jay is a 29 y.o. female who presents to day for Chest Pain; Surgical Clearance (Dental ); Shortness of Breath; and Congestive Heart Failure.    CHIEF COMPLIANT  Chief Complaint   Patient presents with   • Chest Pain   • Surgical Clearance     Dental    • Shortness of Breath   • Congestive Heart Failure       Active Problems:  Problem List Items Addressed This Visit        Respiratory    Shortness of breath       Nervous and Auditory    Precordial pain       Other    History of prosthetic aortic valve    Pre-operative cardiovascular examination - Primary    Cigarette smoker          HPI  HPI  Will need total teeth extraction however the patient has history of aortic valve replacement she is not anticoagulating so probably her med valve is tissue valve she also had an episode of congestive cardiac failure after giving birth in 2012 all of this was done at the [facility] since the last time she she has seen a cardiologist 2014 she gets short of breath doing housework and walking short distances she also gets lower extremity edema she also gets sometimes chest pains when she gets nervous related to exertion currently she is on Suboxone she has a history drug abuse she used to use IV drug abuse but she has not been using any drugs since 2016 she smokes tobacco her grandfather had a heart problem  PRIOR MEDS  Current Outpatient Medications on File Prior to Visit   Medication Sig Dispense Refill   • amoxicillin (AMOXIL) 500 MG capsule      • buprenorphine-naloxone (SUBOXONE) 8-2 MG per SL tablet      • buprenorphine (SUBUTEX) 8 MG sublingual tablet SL tablet Place 8 mg under the tongue Every 12 (Twelve) Hours.     • Prenatal Vit-Fe Fumarate-FA (PRENATAL VITAMIN 27-0.8) 27-0.8 MG tablet tablet Take 1 tablet by mouth Daily.       No current facility-administered medications on file prior to visit.        ALLERGIES  Sulfamethoxazole and  "Trimethoprim    HISTORY  Past Medical History:   Diagnosis Date   • Congestive heart failure (CMS/HCC)    • Hepatitis C    • Hypertension        Social History     Socioeconomic History   • Marital status: Single     Spouse name: Not on file   • Number of children: Not on file   • Years of education: Not on file   • Highest education level: Not on file   Tobacco Use   • Smoking status: Current Every Day Smoker     Packs/day: 1.00     Types: Cigarettes   • Smokeless tobacco: Never Used   Substance and Sexual Activity   • Alcohol use: No   • Drug use: Yes     Types: Benzodiazepines, Marijuana, Methamphetamines, Oxycodone   • Sexual activity: Defer     Partners: Male     Birth control/protection: Surgical       No family history on file.    Review of Systems   Constitutional: Negative for activity change and appetite change.   HENT: Negative for congestion and drooling.    Eyes: Negative for discharge and itching.   Cardiovascular: Positive for chest pain, palpitations and leg swelling.   Gastrointestinal: Negative for abdominal distention and abdominal pain.   Endocrine: Negative for cold intolerance and heat intolerance.   Genitourinary: Negative for flank pain.   Musculoskeletal: Negative for neck stiffness.   Skin: Negative for color change and pallor.   Allergic/Immunologic: Negative for immunocompromised state.   Neurological: Negative for facial asymmetry and light-headedness.   Hematological: Does not bruise/bleed easily.   Psychiatric/Behavioral: Negative for agitation and behavioral problems. The patient is nervous/anxious.        Objective     VITALS: /68 (BP Location: Right arm, Patient Position: Sitting, Cuff Size: Adult)   Pulse 63   Ht 172.7 cm (67.99\")   Wt 54.9 kg (121 lb)   SpO2 98%   BMI 18.40 kg/m²     LABS:   Lab Results (most recent)     None          IMAGING:   No Images in the past 120 days found..    EXAM:  Physical Exam   Constitutional: She is oriented to person, place, and time. " She appears well-developed and well-nourished.   HENT:   Head: Normocephalic and atraumatic.   Eyes: Pupils are equal, round, and reactive to light.   Neck: Neck supple. No JVD present. No thyromegaly present.   Cardiovascular: Normal rate, regular rhythm, normal heart sounds and intact distal pulses. Exam reveals no gallop and no friction rub.   No murmur heard.  Pulmonary/Chest: Effort normal and breath sounds normal. No stridor. No respiratory distress. She has no wheezes. She has no rales. She exhibits no tenderness.   Musculoskeletal: She exhibits no edema, tenderness or deformity.   Neurological: She is alert and oriented to person, place, and time. No cranial nerve deficit. Coordination normal.   Skin: Skin is warm and dry.   Psychiatric: She has a normal mood and affect.   Vitals reviewed.      Procedure     ECG 12 Lead  Date/Time: 3/5/2020 9:54 AM  Performed by: Albert Pérez MD  Authorized by: Albert Pérez MD           EKG: Sinus bradycardia, RAD, otherwise unremarkable EKG, no previous EKG available for comparison       Assessment/Plan    Diagnosis Plan   1. Pre-operative cardiovascular examination     2. History of prosthetic aortic valve     3. Precordial pain     4. Shortness of breath     5. Cigarette smoker     1.  We will proceed with a cardiac work-up she will indeed need endocarditis prophylaxis prior to get [facility]  2.  We will get an echocardiogram to assess her cardiac function will motion valve morphology  3.  Chest pain is atypical related to exertion  4.  Shortness of breath is multifactorial she is advised to quit smoking and will get echo for further assessment of the cardiac function  5.  She is advised to quit smoking  No follow-ups on file.    Mitchell was seen today for chest pain, surgical clearance, shortness of breath and congestive heart failure.    Diagnoses and all orders for this visit:    Pre-operative cardiovascular examination    History of  prosthetic aortic valve    Precordial pain    Shortness of breath    Cigarette smoker    Other orders  -     ECG 12 Lead               MEDS ORDERED DURING VISIT:  No orders of the defined types were placed in this encounter.        This document has been electronically signed by Albert Pérez MD  March 5, 2020 10:38 AM

## 2020-03-12 ENCOUNTER — HOSPITAL ENCOUNTER (OUTPATIENT)
Dept: CARDIOLOGY | Facility: HOSPITAL | Age: 30
Discharge: HOME | End: 2020-03-12
Admitting: SPECIALIST

## 2020-03-12 DIAGNOSIS — Z95.2 HISTORY OF PROSTHETIC AORTIC VALVE: ICD-10-CM

## 2020-03-12 DIAGNOSIS — R06.02 SHORTNESS OF BREATH: ICD-10-CM

## 2020-03-12 LAB
BH CV ECHO MEAS - % IVS THICK: 12.6 %
BH CV ECHO MEAS - % LVPW THICK: 55.1 %
BH CV ECHO MEAS - ACS: 2.2 CM
BH CV ECHO MEAS - AO ROOT AREA (BSA CORRECTED): 1.8
BH CV ECHO MEAS - AO ROOT AREA: 6.8 CM^2
BH CV ECHO MEAS - AO ROOT DIAM: 3 CM
BH CV ECHO MEAS - BSA(HAYCOCK): 1.6 M^2
BH CV ECHO MEAS - BSA: 1.6 M^2
BH CV ECHO MEAS - BZI_BMI: 19 KILOGRAMS/M^2
BH CV ECHO MEAS - BZI_METRIC_HEIGHT: 170.2 CM
BH CV ECHO MEAS - BZI_METRIC_WEIGHT: 54.9 KG
BH CV ECHO MEAS - EDV(CUBED): 111.6 ML
BH CV ECHO MEAS - EDV(MOD-SP4): 58.5 ML
BH CV ECHO MEAS - EDV(TEICH): 108.3 ML
BH CV ECHO MEAS - EF(CUBED): 52.9 %
BH CV ECHO MEAS - EF(MOD-BP): 72 %
BH CV ECHO MEAS - EF(MOD-SP4): 72 %
BH CV ECHO MEAS - EF(TEICH): 44.8 %
BH CV ECHO MEAS - ESV(CUBED): 52.5 ML
BH CV ECHO MEAS - ESV(MOD-SP4): 16.4 ML
BH CV ECHO MEAS - ESV(TEICH): 59.8 ML
BH CV ECHO MEAS - FS: 22.2 %
BH CV ECHO MEAS - IVS/LVPW: 0.99
BH CV ECHO MEAS - IVSD: 0.68 CM
BH CV ECHO MEAS - IVSS: 0.77 CM
BH CV ECHO MEAS - LA DIMENSION: 3 CM
BH CV ECHO MEAS - LA/AO: 1
BH CV ECHO MEAS - LV DIASTOLIC VOL/BSA (35-75): 35.8 ML/M^2
BH CV ECHO MEAS - LV MASS(C)D: 103.9 GRAMS
BH CV ECHO MEAS - LV MASS(C)DI: 63.6 GRAMS/M^2
BH CV ECHO MEAS - LV MASS(C)S: 100.7 GRAMS
BH CV ECHO MEAS - LV MASS(C)SI: 61.6 GRAMS/M^2
BH CV ECHO MEAS - LV SYSTOLIC VOL/BSA (12-30): 10 ML/M^2
BH CV ECHO MEAS - LVIDD: 4.8 CM
BH CV ECHO MEAS - LVIDS: 3.7 CM
BH CV ECHO MEAS - LVLD AP4: 8.6 CM
BH CV ECHO MEAS - LVLS AP4: 6.9 CM
BH CV ECHO MEAS - LVOT AREA (M): 2.8 CM^2
BH CV ECHO MEAS - LVOT AREA: 2.8 CM^2
BH CV ECHO MEAS - LVOT DIAM: 1.9 CM
BH CV ECHO MEAS - LVPWD: 0.68 CM
BH CV ECHO MEAS - LVPWS: 1.1 CM
BH CV ECHO MEAS - MV A MAX VEL: 45 CM/SEC
BH CV ECHO MEAS - MV E MAX VEL: 83.6 CM/SEC
BH CV ECHO MEAS - MV E/A: 1.9
BH CV ECHO MEAS - PA ACC TIME: 0.15 SEC
BH CV ECHO MEAS - PA PR(ACCEL): 11.1 MMHG
BH CV ECHO MEAS - RVDD: 1.8 CM
BH CV ECHO MEAS - SI(CUBED): 36.2 ML/M^2
BH CV ECHO MEAS - SI(MOD-SP4): 25.8 ML/M^2
BH CV ECHO MEAS - SI(TEICH): 29.7 ML/M^2
BH CV ECHO MEAS - SV(CUBED): 59.1 ML
BH CV ECHO MEAS - SV(MOD-SP4): 42.1 ML
BH CV ECHO MEAS - SV(TEICH): 48.5 ML
MAXIMAL PREDICTED HEART RATE: 192 BPM
STRESS TARGET HR: 163 BPM

## 2020-03-12 PROCEDURE — 93306 TTE W/DOPPLER COMPLETE: CPT

## 2020-03-12 PROCEDURE — 93306 TTE W/DOPPLER COMPLETE: CPT | Performed by: SPECIALIST

## 2021-03-18 ENCOUNTER — BULK ORDERING (OUTPATIENT)
Dept: CASE MANAGEMENT | Facility: OTHER | Age: 31
End: 2021-03-18

## 2021-03-18 DIAGNOSIS — Z23 IMMUNIZATION DUE: ICD-10-CM

## 2023-09-15 ENCOUNTER — APPOINTMENT (OUTPATIENT)
Dept: ULTRASOUND IMAGING | Facility: HOSPITAL | Age: 33
DRG: 603 | End: 2023-09-15
Payer: COMMERCIAL

## 2023-09-15 ENCOUNTER — APPOINTMENT (OUTPATIENT)
Dept: CT IMAGING | Facility: HOSPITAL | Age: 33
DRG: 603 | End: 2023-09-15
Payer: COMMERCIAL

## 2023-09-15 ENCOUNTER — HOSPITAL ENCOUNTER (INPATIENT)
Facility: HOSPITAL | Age: 33
LOS: 3 days | Discharge: HOME | DRG: 603 | End: 2023-09-18
Attending: EMERGENCY MEDICINE | Admitting: INTERNAL MEDICINE
Payer: COMMERCIAL

## 2023-09-15 DIAGNOSIS — L03.115 CELLULITIS OF RIGHT FOOT: Primary | ICD-10-CM

## 2023-09-15 DIAGNOSIS — S81.801A OPEN WOUND OF RIGHT LOWER EXTREMITY, INITIAL ENCOUNTER: ICD-10-CM

## 2023-09-15 LAB
ALBUMIN SERPL-MCNC: 3.4 G/DL (ref 3.5–5.2)
ALBUMIN/GLOB SERPL: 0.9 G/DL
ALP SERPL-CCNC: 86 U/L (ref 39–117)
ALT SERPL W P-5'-P-CCNC: 8 U/L (ref 1–33)
AMPHET+METHAMPHET UR QL: NEGATIVE
AMPHETAMINES UR QL: NEGATIVE
ANION GAP SERPL CALCULATED.3IONS-SCNC: 10.7 MMOL/L (ref 5–15)
AST SERPL-CCNC: 12 U/L (ref 1–32)
BARBITURATES UR QL SCN: NEGATIVE
BASOPHILS # BLD AUTO: 0.03 10*3/MM3 (ref 0–0.2)
BASOPHILS NFR BLD AUTO: 0.3 % (ref 0–1.5)
BENZODIAZ UR QL SCN: POSITIVE
BILIRUB SERPL-MCNC: 0.4 MG/DL (ref 0–1.2)
BILIRUB UR QL STRIP: NEGATIVE
BUN SERPL-MCNC: 4 MG/DL (ref 6–20)
BUN/CREAT SERPL: 7 (ref 7–25)
BUPRENORPHINE SERPL-MCNC: POSITIVE NG/ML
CALCIUM SPEC-SCNC: 9.3 MG/DL (ref 8.6–10.5)
CANNABINOIDS SERPL QL: POSITIVE
CHLORIDE SERPL-SCNC: 96 MMOL/L (ref 98–107)
CLARITY UR: CLEAR
CO2 SERPL-SCNC: 26.3 MMOL/L (ref 22–29)
COCAINE UR QL: NEGATIVE
COLOR UR: YELLOW
CREAT SERPL-MCNC: 0.57 MG/DL (ref 0.57–1)
CRP SERPL-MCNC: 17.45 MG/DL (ref 0–0.5)
D-LACTATE SERPL-SCNC: 1.2 MMOL/L (ref 0.5–2)
DEPRECATED RDW RBC AUTO: 38.8 FL (ref 37–54)
EGFRCR SERPLBLD CKD-EPI 2021: 124 ML/MIN/1.73
EOSINOPHIL # BLD AUTO: 0.03 10*3/MM3 (ref 0–0.4)
EOSINOPHIL NFR BLD AUTO: 0.3 % (ref 0.3–6.2)
ERYTHROCYTE [DISTWIDTH] IN BLOOD BY AUTOMATED COUNT: 12 % (ref 12.3–15.4)
FENTANYL UR-MCNC: NEGATIVE NG/ML
GLOBULIN UR ELPH-MCNC: 4 GM/DL
GLUCOSE SERPL-MCNC: 97 MG/DL (ref 65–99)
GLUCOSE UR STRIP-MCNC: NEGATIVE MG/DL
HCT VFR BLD AUTO: 37.4 % (ref 34–46.6)
HGB BLD-MCNC: 12.5 G/DL (ref 12–15.9)
HGB UR QL STRIP.AUTO: NEGATIVE
HOLD SPECIMEN: NORMAL
HOLD SPECIMEN: NORMAL
IMM GRANULOCYTES # BLD AUTO: 0.07 10*3/MM3 (ref 0–0.05)
IMM GRANULOCYTES NFR BLD AUTO: 0.6 % (ref 0–0.5)
KETONES UR QL STRIP: NEGATIVE
LEUKOCYTE ESTERASE UR QL STRIP.AUTO: NEGATIVE
LIPASE SERPL-CCNC: 14 U/L (ref 13–60)
LYMPHOCYTES # BLD AUTO: 2.16 10*3/MM3 (ref 0.7–3.1)
LYMPHOCYTES NFR BLD AUTO: 20.1 % (ref 19.6–45.3)
MCH RBC QN AUTO: 29.8 PG (ref 26.6–33)
MCHC RBC AUTO-ENTMCNC: 33.4 G/DL (ref 31.5–35.7)
MCV RBC AUTO: 89.3 FL (ref 79–97)
METHADONE UR QL SCN: NEGATIVE
MONOCYTES # BLD AUTO: 0.82 10*3/MM3 (ref 0.1–0.9)
MONOCYTES NFR BLD AUTO: 7.6 % (ref 5–12)
NEUTROPHILS NFR BLD AUTO: 7.66 10*3/MM3 (ref 1.7–7)
NEUTROPHILS NFR BLD AUTO: 71.1 % (ref 42.7–76)
NITRITE UR QL STRIP: NEGATIVE
NRBC BLD AUTO-RTO: 0 /100 WBC (ref 0–0.2)
OPIATES UR QL: POSITIVE
OXYCODONE UR QL SCN: NEGATIVE
PCP UR QL SCN: NEGATIVE
PH UR STRIP.AUTO: 6.5 [PH] (ref 5–8)
PLATELET # BLD AUTO: 325 10*3/MM3 (ref 140–450)
PMV BLD AUTO: 9.3 FL (ref 6–12)
POTASSIUM SERPL-SCNC: 3.3 MMOL/L (ref 3.5–5.2)
PROPOXYPH UR QL: NEGATIVE
PROT SERPL-MCNC: 7.4 G/DL (ref 6–8.5)
PROT UR QL STRIP: NEGATIVE
RBC # BLD AUTO: 4.19 10*6/MM3 (ref 3.77–5.28)
SODIUM SERPL-SCNC: 133 MMOL/L (ref 136–145)
SP GR UR STRIP: <=1.005 (ref 1–1.03)
TRICYCLICS UR QL SCN: NEGATIVE
UROBILINOGEN UR QL STRIP: NORMAL
WBC NRBC COR # BLD: 10.77 10*3/MM3 (ref 3.4–10.8)
WHOLE BLOOD HOLD COAG: NORMAL
WHOLE BLOOD HOLD SPECIMEN: NORMAL

## 2023-09-15 PROCEDURE — 86140 C-REACTIVE PROTEIN: CPT | Performed by: NURSE PRACTITIONER

## 2023-09-15 PROCEDURE — 99285 EMERGENCY DEPT VISIT HI MDM: CPT

## 2023-09-15 PROCEDURE — 25010000002 HYDROMORPHONE 1 MG/ML SOLUTION: Performed by: SURGERY

## 2023-09-15 PROCEDURE — 80053 COMPREHEN METABOLIC PANEL: CPT | Performed by: NURSE PRACTITIONER

## 2023-09-15 PROCEDURE — 97598 DBRDMT OPN WND ADDL 20CM/<: CPT | Performed by: SURGERY

## 2023-09-15 PROCEDURE — 81003 URINALYSIS AUTO W/O SCOPE: CPT | Performed by: NURSE PRACTITIONER

## 2023-09-15 PROCEDURE — 25010000002 HYDROMORPHONE 1 MG/ML SOLUTION

## 2023-09-15 PROCEDURE — 93926 LOWER EXTREMITY STUDY: CPT

## 2023-09-15 PROCEDURE — 73701 CT LOWER EXTREMITY W/DYE: CPT

## 2023-09-15 PROCEDURE — 93926 LOWER EXTREMITY STUDY: CPT | Performed by: RADIOLOGY

## 2023-09-15 PROCEDURE — 25510000001 IOPAMIDOL 61 % SOLUTION: Performed by: EMERGENCY MEDICINE

## 2023-09-15 PROCEDURE — 85025 COMPLETE CBC W/AUTO DIFF WBC: CPT | Performed by: NURSE PRACTITIONER

## 2023-09-15 PROCEDURE — 0HBMXZZ EXCISION OF RIGHT FOOT SKIN, EXTERNAL APPROACH: ICD-10-PCS | Performed by: SURGERY

## 2023-09-15 PROCEDURE — 0J9Q0ZZ DRAINAGE OF RIGHT FOOT SUBCUTANEOUS TISSUE AND FASCIA, OPEN APPROACH: ICD-10-PCS | Performed by: SURGERY

## 2023-09-15 PROCEDURE — 99222 1ST HOSP IP/OBS MODERATE 55: CPT | Performed by: SURGERY

## 2023-09-15 PROCEDURE — 36415 COLL VENOUS BLD VENIPUNCTURE: CPT

## 2023-09-15 PROCEDURE — 97597 DBRDMT OPN WND 1ST 20 CM/<: CPT | Performed by: SURGERY

## 2023-09-15 PROCEDURE — 87040 BLOOD CULTURE FOR BACTERIA: CPT | Performed by: NURSE PRACTITIONER

## 2023-09-15 PROCEDURE — 25010000002 VANCOMYCIN 1 G RECONSTITUTED SOLUTION 1 EACH VIAL: Performed by: NURSE PRACTITIONER

## 2023-09-15 PROCEDURE — 25010000002 VANCOMYCIN 5 G RECONSTITUTED SOLUTION: Performed by: INTERNAL MEDICINE

## 2023-09-15 PROCEDURE — 73701 CT LOWER EXTREMITY W/DYE: CPT | Performed by: RADIOLOGY

## 2023-09-15 PROCEDURE — 80307 DRUG TEST PRSMV CHEM ANLYZR: CPT | Performed by: NURSE PRACTITIONER

## 2023-09-15 PROCEDURE — 25010000002 PIPERACILLIN SOD-TAZOBACTAM PER 1 G: Performed by: NURSE PRACTITIONER

## 2023-09-15 PROCEDURE — 83605 ASSAY OF LACTIC ACID: CPT | Performed by: NURSE PRACTITIONER

## 2023-09-15 PROCEDURE — 25010000002 PIPERACILLIN SOD-TAZOBACTAM PER 1 G: Performed by: INTERNAL MEDICINE

## 2023-09-15 PROCEDURE — 83690 ASSAY OF LIPASE: CPT | Performed by: NURSE PRACTITIONER

## 2023-09-15 RX ORDER — CALCIUM CARBONATE 500 MG/1
2 TABLET, CHEWABLE ORAL 2 TIMES DAILY PRN
Status: DISCONTINUED | OUTPATIENT
Start: 2023-09-15 | End: 2023-09-18 | Stop reason: HOSPADM

## 2023-09-15 RX ORDER — BUPRENORPHINE HYDROCHLORIDE AND NALOXONE HYDROCHLORIDE DIHYDRATE 8; 2 MG/1; MG/1
1.75 TABLET SUBLINGUAL DAILY
COMMUNITY

## 2023-09-15 RX ORDER — POTASSIUM CHLORIDE 20 MEQ/1
40 TABLET, EXTENDED RELEASE ORAL EVERY 4 HOURS
Status: COMPLETED | OUTPATIENT
Start: 2023-09-15 | End: 2023-09-15

## 2023-09-15 RX ORDER — KETAMINE HCL IN NACL, ISO-OSM 100MG/10ML
1 SYRINGE (ML) INJECTION ONCE
Status: COMPLETED | OUTPATIENT
Start: 2023-09-15 | End: 2023-09-15

## 2023-09-15 RX ORDER — BISACODYL 10 MG
10 SUPPOSITORY, RECTAL RECTAL DAILY PRN
Status: DISCONTINUED | OUTPATIENT
Start: 2023-09-15 | End: 2023-09-18 | Stop reason: HOSPADM

## 2023-09-15 RX ORDER — DOXYCYCLINE 100 MG/1
100 CAPSULE ORAL EVERY 12 HOURS SCHEDULED
Status: CANCELLED | OUTPATIENT
Start: 2023-09-15 | End: 2023-09-25

## 2023-09-15 RX ORDER — SODIUM CHLORIDE 9 MG/ML
40 INJECTION, SOLUTION INTRAVENOUS AS NEEDED
Status: DISCONTINUED | OUTPATIENT
Start: 2023-09-15 | End: 2023-09-18 | Stop reason: HOSPADM

## 2023-09-15 RX ORDER — POTASSIUM CHLORIDE 20 MEQ/1
40 TABLET, EXTENDED RELEASE ORAL ONCE
Status: COMPLETED | OUTPATIENT
Start: 2023-09-15 | End: 2023-09-15

## 2023-09-15 RX ORDER — BISACODYL 5 MG/1
5 TABLET, DELAYED RELEASE ORAL DAILY PRN
Status: DISCONTINUED | OUTPATIENT
Start: 2023-09-15 | End: 2023-09-18 | Stop reason: HOSPADM

## 2023-09-15 RX ORDER — POLYETHYLENE GLYCOL 3350 17 G/17G
17 POWDER, FOR SOLUTION ORAL DAILY PRN
Status: DISCONTINUED | OUTPATIENT
Start: 2023-09-15 | End: 2023-09-18 | Stop reason: HOSPADM

## 2023-09-15 RX ORDER — BUPRENORPHINE HYDROCHLORIDE AND NALOXONE HYDROCHLORIDE DIHYDRATE 8; 2 MG/1; MG/1
1.75 TABLET SUBLINGUAL DAILY
Status: DISCONTINUED | OUTPATIENT
Start: 2023-09-15 | End: 2023-09-16

## 2023-09-15 RX ORDER — HYDROCODONE BITARTRATE AND ACETAMINOPHEN 7.5; 325 MG/1; MG/1
1 TABLET ORAL EVERY 8 HOURS PRN
COMMUNITY
End: 2023-09-18 | Stop reason: HOSPADM

## 2023-09-15 RX ORDER — SODIUM CHLORIDE 0.9 % (FLUSH) 0.9 %
10 SYRINGE (ML) INJECTION AS NEEDED
Status: DISCONTINUED | OUTPATIENT
Start: 2023-09-15 | End: 2023-09-18 | Stop reason: HOSPADM

## 2023-09-15 RX ORDER — AMOXICILLIN 250 MG
2 CAPSULE ORAL 2 TIMES DAILY
Status: DISCONTINUED | OUTPATIENT
Start: 2023-09-15 | End: 2023-09-18 | Stop reason: HOSPADM

## 2023-09-15 RX ORDER — HYDROCODONE BITARTRATE AND ACETAMINOPHEN 5; 325 MG/1; MG/1
1 TABLET ORAL ONCE
Status: COMPLETED | OUTPATIENT
Start: 2023-09-15 | End: 2023-09-15

## 2023-09-15 RX ORDER — DOXYCYCLINE HYCLATE 100 MG/1
100 CAPSULE ORAL 2 TIMES DAILY
COMMUNITY
End: 2023-09-18 | Stop reason: HOSPADM

## 2023-09-15 RX ORDER — NICOTINE 21 MG/24HR
1 PATCH, TRANSDERMAL 24 HOURS TRANSDERMAL ONCE
Status: COMPLETED | OUTPATIENT
Start: 2023-09-15 | End: 2023-09-16

## 2023-09-15 RX ORDER — AMOXICILLIN 875 MG/1
875 TABLET, COATED ORAL 2 TIMES DAILY
COMMUNITY
End: 2023-09-18 | Stop reason: HOSPADM

## 2023-09-15 RX ORDER — HYDROCODONE BITARTRATE AND ACETAMINOPHEN 7.5; 325 MG/1; MG/1
1 TABLET ORAL EVERY 8 HOURS PRN
Status: CANCELLED | OUTPATIENT
Start: 2023-09-15

## 2023-09-15 RX ORDER — SODIUM CHLORIDE 0.9 % (FLUSH) 0.9 %
10 SYRINGE (ML) INJECTION EVERY 12 HOURS SCHEDULED
Status: DISCONTINUED | OUTPATIENT
Start: 2023-09-15 | End: 2023-09-18 | Stop reason: HOSPADM

## 2023-09-15 RX ORDER — ENOXAPARIN SODIUM 100 MG/ML
40 INJECTION SUBCUTANEOUS DAILY
Status: DISCONTINUED | OUTPATIENT
Start: 2023-09-15 | End: 2023-09-18 | Stop reason: HOSPADM

## 2023-09-15 RX ADMIN — IOPAMIDOL 85 ML: 612 INJECTION, SOLUTION INTRAVENOUS at 14:26

## 2023-09-15 RX ADMIN — Medication 10 ML: at 20:00

## 2023-09-15 RX ADMIN — Medication 45.8 MG: at 15:58

## 2023-09-15 RX ADMIN — HYDROMORPHONE HYDROCHLORIDE 2 MG: 1 INJECTION, SOLUTION INTRAMUSCULAR; INTRAVENOUS; SUBCUTANEOUS at 16:11

## 2023-09-15 RX ADMIN — HYDROCODONE BITARTRATE AND ACETAMINOPHEN 1 TABLET: 5; 325 TABLET ORAL at 13:30

## 2023-09-15 RX ADMIN — MUPIROCIN 1 APPLICATION: 20 OINTMENT TOPICAL at 20:00

## 2023-09-15 RX ADMIN — NICOTINE TRANSDERMAL SYSTEM 1 PATCH: 21 PATCH, EXTENDED RELEASE TRANSDERMAL at 14:29

## 2023-09-15 RX ADMIN — POTASSIUM CHLORIDE 40 MEQ: 1500 TABLET, EXTENDED RELEASE ORAL at 23:54

## 2023-09-15 RX ADMIN — PIPERACILLIN SODIUM AND TAZOBACTAM SODIUM 3.38 G: 3; .375 INJECTION, POWDER, LYOPHILIZED, FOR SOLUTION INTRAVENOUS at 17:59

## 2023-09-15 RX ADMIN — HYDROMORPHONE HYDROCHLORIDE 2 MG: 1 INJECTION, SOLUTION INTRAMUSCULAR; INTRAVENOUS; SUBCUTANEOUS at 16:48

## 2023-09-15 RX ADMIN — BUPRENORPHINE HYDROCHLORIDE AND NALOXONE HYDROCHLORIDE DIHYDRATE 1.75 TABLET: 8; 2 TABLET SUBLINGUAL at 20:00

## 2023-09-15 RX ADMIN — PIPERACILLIN SODIUM AND TAZOBACTAM SODIUM 3.38 G: 3; .375 INJECTION, POWDER, LYOPHILIZED, FOR SOLUTION INTRAVENOUS at 23:24

## 2023-09-15 RX ADMIN — VANCOMYCIN HYDROCHLORIDE 1250 MG: 500 INJECTION, POWDER, LYOPHILIZED, FOR SOLUTION INTRAVENOUS at 21:11

## 2023-09-15 RX ADMIN — POTASSIUM CHLORIDE 40 MEQ: 1500 TABLET, EXTENDED RELEASE ORAL at 20:00

## 2023-09-15 RX ADMIN — VANCOMYCIN HYDROCHLORIDE 1000 MG: 1 INJECTION, POWDER, LYOPHILIZED, FOR SOLUTION INTRAVENOUS at 13:33

## 2023-09-15 RX ADMIN — POTASSIUM CHLORIDE 40 MEQ: 1500 TABLET, EXTENDED RELEASE ORAL at 13:30

## 2023-09-15 RX ADMIN — PIPERACILLIN SODIUM AND TAZOBACTAM SODIUM 3.38 G: 3; .375 INJECTION, POWDER, LYOPHILIZED, FOR SOLUTION INTRAVENOUS at 14:35

## 2023-09-15 RX ADMIN — SODIUM CHLORIDE 1000 ML: 9 INJECTION, SOLUTION INTRAVENOUS at 15:55

## 2023-09-15 NOTE — PROGRESS NOTES
Pharmacokinetics Service Note:    Ms. Jay has been evaluated for vancomycin dosing to treat her R foot wound with cellulitis.  She received a 1 gm loading dose ~ 1330.  Based on her estimated normal renal function, demographics, and predicted pharmacokinetic parameters, will continue dosing at 1.25 gm q12hrs to target an AUC range of 500-600 mg/L*hr.  Will redose in 8 hours to complete the loading process.  Will plan to obtain a level Carlos morning to determine if any adjustments are needed.    Thank you.  Vickie Mercer, Pharm.D.  9/15/2023  17:21 EDT

## 2023-09-15 NOTE — PLAN OF CARE
Goal Outcome Evaluation:                      Patient arrived to 3N from ER. SO at bedside. VSS on RA.Dressing to RLE CDI. No acute changes or complaints, will continue POC.

## 2023-09-15 NOTE — ED PROVIDER NOTES
"Subjective   History of Present Illness  Patient is a 33 year old female with PMH significant for CHF, hypertension, hepatitis C and substance abuse. She presents to the ED for R foot wound infection. She states that she believes this started out as a \"spider bite\". She has been seeing her PCP for this and they had been doing wound care and packing the wound. She reports drainage from the wound and pain. They advised her to come to the ED for further evaluation.       Review of Systems   Constitutional: Negative.  Negative for fever.   HENT: Negative.     Eyes: Negative.    Respiratory: Negative.     Cardiovascular: Negative.  Negative for chest pain.   Gastrointestinal: Negative.  Negative for abdominal pain.   Endocrine: Negative.    Genitourinary: Negative.  Negative for dysuria.   Skin:  Positive for wound.   Allergic/Immunologic: Negative.    Neurological: Negative.    Psychiatric/Behavioral: Negative.     All other systems reviewed and are negative.    Past Medical History:   Diagnosis Date    Congestive heart failure     Hepatitis C     Hypertension        Allergies   Allergen Reactions    Sulfamethoxazole Anaphylaxis    Trimethoprim Anaphylaxis       Past Surgical History:   Procedure Laterality Date     SECTION WITH TUBAL Bilateral 2017    Procedure:  SECTION REPEAT WITH TUBAL;  Surgeon: Jonas Wray MD;  Location: [facility] LABOR DELIVERY;  Service:        No family history on file.    Social History     Socioeconomic History    Marital status:    Tobacco Use    Smoking status: Every Day     Packs/day: 1.00     Types: Cigarettes    Smokeless tobacco: Never   Substance and Sexual Activity    Alcohol use: No    Drug use: Yes     Types: Benzodiazepines, Marijuana, Methamphetamines, Oxycodone    Sexual activity: Defer     Partners: Male     Birth control/protection: Surgical           Objective   Physical Exam  Vitals and nursing note reviewed.   Constitutional:       General: She " is not in acute distress.     Appearance: She is well-developed. She is not diaphoretic.   HENT:      Head: Normocephalic and atraumatic.      Right Ear: External ear normal.      Left Ear: External ear normal.      Nose: Nose normal.   Eyes:      Conjunctiva/sclera: Conjunctivae normal.      Pupils: Pupils are equal, round, and reactive to light.   Neck:      Vascular: No JVD.      Trachea: No tracheal deviation.   Cardiovascular:      Rate and Rhythm: Normal rate and regular rhythm.      Heart sounds: Normal heart sounds. No murmur heard.  Pulmonary:      Effort: Pulmonary effort is normal. No respiratory distress.      Breath sounds: Normal breath sounds. No wheezing.   Abdominal:      General: Bowel sounds are normal.      Palpations: Abdomen is soft.      Tenderness: There is no abdominal tenderness.   Musculoskeletal:         General: No deformity. Normal range of motion.      Cervical back: Normal range of motion and neck supple.   Skin:     General: Skin is warm and dry.      Coloration: Skin is not pale.      Findings: Wound present. No erythema or rash.      Comments: See photo in chart.   Neurological:      Mental Status: She is alert and oriented to person, place, and time.      Cranial Nerves: No cranial nerve deficit.   Psychiatric:         Behavior: Behavior normal.         Thought Content: Thought content normal.       Procedures               Results for orders placed or performed during the hospital encounter of 09/15/23   Comprehensive Metabolic Panel    Specimen: Blood   Result Value Ref Range    Glucose 97 65 - 99 mg/dL    BUN 4 (L) 6 - 20 mg/dL    Creatinine 0.57 0.57 - 1.00 mg/dL    Sodium 133 (L) 136 - 145 mmol/L    Potassium 3.3 (L) 3.5 - 5.2 mmol/L    Chloride 96 (L) 98 - 107 mmol/L    CO2 26.3 22.0 - 29.0 mmol/L    Calcium 9.3 8.6 - 10.5 mg/dL    Total Protein 7.4 6.0 - 8.5 g/dL    Albumin 3.4 (L) 3.5 - 5.2 g/dL    ALT (SGPT) 8 1 - 33 U/L    AST (SGOT) 12 1 - 32 U/L    Alkaline Phosphatase  86 39 - 117 U/L    Total Bilirubin 0.4 0.0 - 1.2 mg/dL    Globulin 4.0 gm/dL    A/G Ratio 0.9 g/dL    BUN/Creatinine Ratio 7.0 7.0 - 25.0    Anion Gap 10.7 5.0 - 15.0 mmol/L    eGFR 124.0 >60.0 mL/min/1.73   Lipase    Specimen: Blood   Result Value Ref Range    Lipase 14 13 - 60 U/L   Urinalysis With Microscopic If Indicated (No Culture) - Urine, Clean Catch    Specimen: Urine, Clean Catch   Result Value Ref Range    Color, UA Yellow Yellow, Straw    Appearance, UA Clear Clear    pH, UA 6.5 5.0 - 8.0    Specific Gravity, UA <=1.005 1.005 - 1.030    Glucose, UA Negative Negative    Ketones, UA Negative Negative    Bilirubin, UA Negative Negative    Blood, UA Negative Negative    Protein, UA Negative Negative    Leuk Esterase, UA Negative Negative    Nitrite, UA Negative Negative    Urobilinogen, UA 0.2 E.U./dL 0.2 - 1.0 E.U./dL   C-reactive Protein    Specimen: Blood   Result Value Ref Range    C-Reactive Protein 17.45 (H) 0.00 - 0.50 mg/dL   Lactic Acid, Plasma    Specimen: Blood   Result Value Ref Range    Lactate 1.2 0.5 - 2.0 mmol/L   CBC Auto Differential    Specimen: Blood   Result Value Ref Range    WBC 10.77 3.40 - 10.80 10*3/mm3    RBC 4.19 3.77 - 5.28 10*6/mm3    Hemoglobin 12.5 12.0 - 15.9 g/dL    Hematocrit 37.4 34.0 - 46.6 %    MCV 89.3 79.0 - 97.0 fL    MCH 29.8 26.6 - 33.0 pg    MCHC 33.4 31.5 - 35.7 g/dL    RDW 12.0 (L) 12.3 - 15.4 %    RDW-SD 38.8 37.0 - 54.0 fl    MPV 9.3 6.0 - 12.0 fL    Platelets 325 140 - 450 10*3/mm3    Neutrophil % 71.1 42.7 - 76.0 %    Lymphocyte % 20.1 19.6 - 45.3 %    Monocyte % 7.6 5.0 - 12.0 %    Eosinophil % 0.3 0.3 - 6.2 %    Basophil % 0.3 0.0 - 1.5 %    Immature Grans % 0.6 (H) 0.0 - 0.5 %    Neutrophils, Absolute 7.66 (H) 1.70 - 7.00 10*3/mm3    Lymphocytes, Absolute 2.16 0.70 - 3.10 10*3/mm3    Monocytes, Absolute 0.82 0.10 - 0.90 10*3/mm3    Eosinophils, Absolute 0.03 0.00 - 0.40 10*3/mm3    Basophils, Absolute 0.03 0.00 - 0.20 10*3/mm3    Immature Grans, Absolute  0.07 (H) 0.00 - 0.05 10*3/mm3    nRBC 0.0 0.0 - 0.2 /100 WBC   Urine Drug Screen - Urine, Clean Catch    Specimen: Urine, Clean Catch   Result Value Ref Range    THC, Screen, Urine Positive (A) Negative    Phencyclidine (PCP), Urine Negative Negative    Cocaine Screen, Urine Negative Negative    Methamphetamine, Ur Negative Negative    Opiate Screen Positive (A) Negative    Amphetamine Screen, Urine Negative Negative    Benzodiazepine Screen, Urine Positive (A) Negative    Tricyclic Antidepressants Screen Negative Negative    Methadone Screen, Urine Negative Negative    Barbiturates Screen, Urine Negative Negative    Oxycodone Screen, Urine Negative Negative    Propoxyphene Screen Negative Negative    Buprenorphine, Screen, Urine Positive (A) Negative   Fentanyl, Urine - Urine, Clean Catch    Specimen: Urine, Clean Catch   Result Value Ref Range    Fentanyl, Urine Negative Negative   Green Top (Gel)   Result Value Ref Range    Extra Tube Hold for add-ons.    Lavender Top   Result Value Ref Range    Extra Tube hold for add-on    Gold Top - SST   Result Value Ref Range    Extra Tube Hold for add-ons.    Light Blue Top   Result Value Ref Range    Extra Tube Hold for add-ons.       CT Lower Extremity Left With Contrast   Final Result   1. Open injury to the dorsum of the left foot. Air in the soft tissues   as well as edema but no drainable fluid collection   2. No osteomyelitis        This report was finalized on 9/15/2023 2:28 PM by Dr. Farhan Morrison MD.               ED Course  ED Course as of 09/15/23 1516   Fri Sep 15, 2023   1512 Case d/w Dr. Johnson, will admit to hospitalist service. Also, discussed with Dr. Berry with General Surgery who will consult on patient. [MB]      ED Course User Index  [MB] Elena Noriega, FREDDY                                           Medical Decision Making  Problems Addressed:  Cellulitis of right foot: complicated acute illness or injury  Open wound of right lower extremity,  initial encounter: complicated acute illness or injury    Amount and/or Complexity of Data Reviewed  Labs: ordered.  Radiology: ordered.    Risk  OTC drugs.  Prescription drug management.  Decision regarding hospitalization.        Final diagnoses:   Cellulitis of right foot   Open wound of right lower extremity, initial encounter       ED Disposition  ED Disposition       ED Disposition   Decision to Admit    Condition   --    Comment   Level of Care: Med/Surg [1]   Diagnosis: Open wound of right lower extremity [[id number]]   Admitting Physician: CANDIE BLOOM [[id number]]   Certification: I Certify That Inpatient Hospital Services Are Medically Necessary For Greater Than 2 Midnights                 No follow-up provider specified.       Medication List      No changes were made to your prescriptions during this visit.            Elena Noriega APRN  09/15/23 1514       Elena Noriega APRN  09/15/23 1516       Elena Noriega APRN  09/15/23 1522

## 2023-09-15 NOTE — CONSULTS
Patient Name:  Mitchell Jay  YOB: 1990  [id number]       Patient Care Team:  Kadi Archer APRN as PCP - General (Family Medicine)      General Surgery Consult Note     Date of Consultation: 09/15/23    Consulting Physician : Mamadou Johnson, *    Reason for Consult : right foot abscess    Subjective     I have been asked to see  Mitchell Jay , a 33 y.o. female in consultation for a right foot abscess. She initially developed pain and couldn't bear weight on her foot. She then developed an abscess that was drained on Wednesday by her primary care doctor and it was packed. She was told to leave the packing and it was to be changed today. When she went to her primary care there was worsening of the wound and she was referred to the emergency department.  On arrival, she is hemodynamically stable.  Her laboratory values did not show leukocytosis.  She had a large open wound on her right foot and general surgery was consulted for evaluation.      Allergy:   Allergies   Allergen Reactions    Sulfamethoxazole Anaphylaxis    Trimethoprim Anaphylaxis       Medications:  enoxaparin, 40 mg, Subcutaneous, Daily  nicotine, 1 patch, Transdermal, Once  piperacillin-tazobactam, 3.375 g, Intravenous, Once  piperacillin-tazobactam, 3.375 g, Intravenous, Q8H  senna-docusate sodium, 2 tablet, Oral, BID  sodium chloride, 10 mL, Intravenous, Q12H  vancomycin, 1,250 mg, Intravenous, Q12H         No current facility-administered medications on file prior to encounter.     Current Outpatient Medications on File Prior to Encounter   Medication Sig    amoxicillin (AMOXIL) 875 MG tablet Take 1 tablet by mouth 2 (Two) Times a Day.    buprenorphine-naloxone (SUBOXONE) 8-2 MG per SL tablet Place 1.75 tablets under the tongue Daily.    doxycycline (VIBRAMYCIN) 100 MG capsule Take 1 capsule by mouth 2 (Two) Times a Day.    HYDROcodone-acetaminophen (NORCO) 7.5-325 MG per tablet Take 1 tablet by mouth Every 8  "(Eight) Hours As Needed for Moderate Pain or Severe Pain.    mupirocin (BACTROBAN) 2 % ointment Apply 1 application  topically to the appropriate area as directed 2 (Two) Times a Day.    [DISCONTINUED] amoxicillin (AMOXIL) 500 MG capsule     [DISCONTINUED] buprenorphine (SUBUTEX) 8 MG sublingual tablet SL tablet Place 1 tablet under the tongue Every 12 (Twelve) Hours.    [DISCONTINUED] buprenorphine-naloxone (SUBOXONE) 8-2 MG per SL tablet     [DISCONTINUED] Prenatal Vit-Fe Fumarate-FA (PRENATAL VITAMIN 27-0.8) 27-0.8 MG tablet tablet Take 1 tablet by mouth Daily.       PMHx:   Past Medical History:   Diagnosis Date    Congestive heart failure     Hepatitis C     Hypertension        Past Surgical History:  Surgery    Family History: Noncontributory     Social History:  Reports history of illicit drug use, reports no recent drug use.     Review of Systems   Pertinent items are noted in HPI     Constitutional: No fevers, chills or malaise   Eyes: Denies visual changes    Cardiovascular: Denies chest pain, palpitations   Pulmonary: Denies cough or shortness of breath   Abdominal/ GI: Nontender    Genitourinary: Denies dysuria or hematuria   Musculoskeletal: Denies any but chronic joint aches, pains or deformities   Psychiatric: No recent mood changes   Neurologic: No paresthesias or loss of function          Objective     Physical Exam:      Vital Signs  /76 (BP Location: Right arm, Patient Position: Lying)   Pulse 58 Comment: RN aware  Temp 98.8 °F (37.1 °C) (Axillary)   Resp 18   Ht 175.3 cm (69\")   Wt 53.8 kg (118 lb 9.7 oz)   LMP 09/15/2023 (Exact Date)   SpO2 100%   BMI 17.52 kg/m²     Intake/Output Summary (Last 24 hours) at 9/15/2023 1751  Last data filed at 9/15/2023 1722  Gross per 24 hour   Intake 590 ml   Output --   Net 590 ml         Physical Exam:    Head: Normocephalic, atraumatic.   Eyes: Pupils equal, round, react to light   Mouth: No lesions noted  CV: Regular rate and rhythm   Lungs: " Bilateral chest rise and fall, no use of accessory muscles   Abdomen: Soft, nontender, nondistended  Extremities:  No cyanosis, clubbing or edema bilaterally   Neurologic: No gross deficits      Results Review: I have personally reviewed all of the recent lab and imaging results available at this time: CT scan reviewed.     IN-OFFICE PROCEDURE NOTE    Patient Name:  Mitchell Jay  YOB: 1990  [id number]    9/15/2023      PREOPERATIVE DIAGNOSIS: right foot wound       POSTOPERATIVE DIAGNOSIS: same       PROCEDURE PERFORMED: Debridement of right foot wound and drainage of deep abscess of right foot       SURGEON: Awa Berry MD        SPECIMENS: None       ANESTHESIA: None       FINDINGS:   1.  Necrotic tissue around wound edges, purulence tracking up anterior aspect of ankle drained.       INDICATIONS:    The patient is a 33 y.o. female presented to the emergency department with a worsening foot wound.  Risks and benefits of proceeding with debridement were discussed and she elected to proceed.     DESCRIPTION OF PROCEDURE:     Ketamine was administered as the anesthetic.  Once the patient was adequately anesthetized, I removed the previously packing.  This was actually found in multiple segments.  Once this was removed, I began with debridement of the necrotic skin surrounding the wound.  Once this was removed, I probed the wound finding a tract extending up the anterior aspect of the ankle.  Once I probed this with my finger, a large rush of pus escaped from this area.  I then copiously washed the area with Betadine and saline.  I packed the wound and applied a dressing of 4 x 4's and a Kerlix, and with an Ace wrap.  The total wound dimensions measured 6cm wide x 8cm long x 2 cm deep.        Awa Berry MD  9/15/2023  17:57 EDT        Assessment and Plan:    Problem List Items Addressed This Visit          Musculoskeletal and Injuries    * (Principal) Open wound of right lower extremity      Other Visit Diagnoses       Cellulitis of right foot    -  Primary             Active Hospital Problems    Diagnosis  POA    **Open wound of right lower extremity [S81.801A]  Yes      Resolved Hospital Problems   No resolved problems to display.      Ms. Jay is a 33-year-old woman who presents to the emergency department with a right foot abscess.  I performed a debridement and drainage of right deep foot abscess in the emergency department under conscious sedation.  I am concerned about the overall appearance of the wound, although I feel like I did as much as I could safely do at this initial debridement.  We will plan for her to be n.p.o. at midnight and likely will return to the operating room tomorrow for a reevaluation of the wound and further cleanout.  I have marked the extent of the cellulitis I have noted on exam after drainage.  She has scheduled IV antibiotics that she will receive tonight.    I discussed the patient's findings and my recommendations with the patient and/or family, as well as the primary team     Awa Berry MD  09/15/23  17:51 EDT

## 2023-09-15 NOTE — H&P
[facility] Medicine Services  History & Physical    Patient Identification:  Name:  Mitchell Jay  Age:  33 y.o.  Sex:  female  :  1990  MRN:  [id number]   Visit Number:  [id number]  Admit Date: 9/15/2023   Primary Care Physician:  Kadi Archer APRN    Subjective     Chief complaint: Wound infection    History of presenting illness:      Mitchell Jay is a 33 y.o. female who presented for further evaluation of right foot wound. Patient reports wound has been present for approximately 7 days. She notes it initially started as a black, raised nodule with associated sharp, stabbing pain. She initially saw her PCP who suspected a possible spider bite. Ultimately had the wound lanced and packed two days ago. She states her PCP expressed white, purulent material. She notes the wound has continued to drain following, mostly bloody. On follow up with PCP she was advised to seek care in the ED. She denies any associated fever or chills. Further review of systems negative otherwise. Patient does admit history of substance abuse including IVDA but denies use of any injectable drugs recently. She does admit to taking benzodiazepines that are not prescribed to her since onset of foot pain to help her sleep. She is a 2 PPD smoker. She has a history of heart problems, states she had her aortic valve replaced when she was a child. She thinks it is a porcine valve. She also reports a history of heart failure.     Past medical history is significant for polysubstance abuse, history of aortic valve replacement, Hx CHF, hypertension    Upon arrival to the ED, vital signs were temp 99.9, heart rate 76, respirations 17, /94, SPO2 99% on room air.  Laboratory work-up significant for potassium low at 3.3.  CRP is 17.45.  There is no leukocytosis or left shift.  Tox screen positive for benzodiazepines, buprenorphine, opiates, THC.  CT of the right lower extremity notes open injury to the  dorsum of the right foot with air in the soft tissues as well as edema, no drainable fluid collection.  No osteomyelitis.    Known Emergency Department medications received prior to my evaluation included Norco, NicoDerm, Zosyn, potassium chloride, vancomycin.   Emergency Department Room location at the time of my evaluation was [facility].     ---------------------------------------------------------------------------------------------------------------------   Review of Systems   Constitutional:  Negative for chills and fever.   HENT:  Negative for congestion and rhinorrhea.    Respiratory:  Negative for cough and shortness of breath.    Cardiovascular:  Positive for palpitations (chronic). Negative for chest pain.   Gastrointestinal:  Negative for abdominal pain, constipation, diarrhea, nausea and vomiting.   Genitourinary:  Negative for difficulty urinating and dysuria.   Musculoskeletal:  Negative for arthralgias and myalgias.   Skin:  Positive for color change and wound.   Neurological:  Negative for dizziness and light-headedness.      ---------------------------------------------------------------------------------------------------------------------   Past Medical History:   Diagnosis Date    Congestive heart failure     Hepatitis C     Hypertension      Past Surgical History:   Procedure Laterality Date     SECTION WITH TUBAL Bilateral 2017    Procedure:  SECTION REPEAT WITH TUBAL;  Surgeon: Jonas Wray MD;  Location: [facility] LABOR DELIVERY;  Service:      No family history on file.  Social History     Socioeconomic History    Marital status:    Tobacco Use    Smoking status: Every Day     Packs/day: 1.00     Types: Cigarettes    Smokeless tobacco: Never   Substance and Sexual Activity    Alcohol use: No    Drug use: Yes     Types: Benzodiazepines, Marijuana, Methamphetamines, Oxycodone    Sexual activity: Defer     Partners: Male     Birth control/protection: Surgical      ---------------------------------------------------------------------------------------------------------------------   Allergies:  Sulfamethoxazole and Trimethoprim  ---------------------------------------------------------------------------------------------------------------------   Home medications:    Medications below are reported home medications pulling from within the system; at this time, these medications have not been reconciled unless otherwise specified and are in the verification process for further verifcation as current home medications.  (Not in a hospital admission)      Hospital Scheduled Meds:  nicotine, 1 patch, Transdermal, Once           Current listed hospital scheduled medications may not yet reflect those currently placed in orders that are signed and held awaiting patient's arrival to floor.   ---------------------------------------------------------------------------------------------------------------------     Objective     Vital Signs:  Temp:  [99.9 °F (37.7 °C)] 99.9 °F (37.7 °C)  Heart Rate:  [68-76] 68  Resp:  [17] 17  BP: (109-111)/(74-94) 109/74      09/15/23  1212   Weight: 45.8 kg (101 lb)     Body mass index is 15.36 kg/m².  ---------------------------------------------------------------------------------------------------------------------       Physical Exam  Vitals and nursing note reviewed.   Constitutional:       General: She is not in acute distress.     Comments: Thin appearing   HENT:      Head: Normocephalic and atraumatic.   Eyes:      Extraocular Movements: Extraocular movements intact.      Conjunctiva/sclera: Conjunctivae normal.   Cardiovascular:      Rate and Rhythm: Normal rate and regular rhythm.      Heart sounds: Murmur heard.   Pulmonary:      Effort: Pulmonary effort is normal.      Breath sounds: Normal breath sounds.   Abdominal:      Palpations: Abdomen is soft.      Tenderness: There is no abdominal tenderness.   Musculoskeletal:      Right lower  leg: Edema (1+ pitting edema to the level of mid calf) present.   Skin:     General: Skin is warm and dry.      Findings: Erythema present.      Comments: Open wound to dorsum of right foot, just below the ankle. There is surrounding necrotic tissue. Erythema extends to the PIP of toes distally and just above the ankle. Edema to foot and lower leg.    Neurological:      Mental Status: She is alert. Mental status is at baseline.   Psychiatric:         Mood and Affect: Mood normal.         Behavior: Behavior normal.             ---------------------------------------------------------------------------------------------------------------------  EKG:      ---------------------------------------------------------------------------------------------------------------------   Results from last 7 days   Lab Units 09/15/23  1244   CRP mg/dL 17.45*   LACTATE mmol/L 1.2   WBC 10*3/mm3 10.77   HEMOGLOBIN g/dL 12.5   HEMATOCRIT % 37.4   MCV fL 89.3   MCHC g/dL 33.4   PLATELETS 10*3/mm3 325         Results from last 7 days   Lab Units 09/15/23  1244   SODIUM mmol/L 133*   POTASSIUM mmol/L 3.3*   CHLORIDE mmol/L 96*   CO2 mmol/L 26.3   BUN mg/dL 4*   CREATININE mg/dL 0.57   CALCIUM mg/dL 9.3   GLUCOSE mg/dL 97   ALBUMIN g/dL 3.4*   BILIRUBIN mg/dL 0.4   ALK PHOS U/L 86   AST (SGOT) U/L 12   ALT (SGPT) U/L 8   Estimated Creatinine Clearance: 102.4 mL/min (by C-G formula based on SCr of 0.57 mg/dL).  No results found for: AMMONIA          No results found for: HGBA1C  No results found for: TSH, FREET4  Lab Results   Component Value Date    HCGQUANT 38,979 (H) 06/10/2015     Pain Management Panel  More data may exist         Latest Ref Rng & Units 9/15/2023 11/8/2017   Pain Management Panel   Amphetamine, Urine Qual Negative Negative  Negative    Barbiturates Screen, Urine Negative Negative  Negative    Benzodiazepine Screen, Urine Negative Positive  Negative    Buprenorphine, Screen, Urine Negative Positive  Positive    Cocaine  Screen, Urine Negative Negative  Negative    Fentanyl, Urine Negative Negative  -   Methadone Screen , Urine Negative Negative  Negative    Methamphetamine, Ur Negative Negative  -     No results found for: BLOODCX  No results found for: URINECX  No results found for: WOUNDCX  No results found for: STOOLCX      ---------------------------------------------------------------------------------------------------------------------  Imaging Results (Last 7 Days)       Procedure Component Value Units Date/Time    CT Lower Extremity Left With Contrast [[id number]] Collected: 09/15/23 1427     Updated: 09/15/23 1430    Narrative:      EXAMINATION: CT LOWER EXTREMITY LEFT W CONTRAST-      CLINICAL INDICATION: Left foot        COMPARISON: None available.     Radiation dose reduction techniques were utilized per ALARA protocol.  Automated exposure control was initiated through either or Coinex-IO or  DoseRight software packages by  protocol.           PROCEDURE:  Axial images through the left foot were acquired during IV contrast.  Reformatted images were created.     FINDINGS:  There is soft tissue injury with air in the soft tissues as well as  edema in the dorsum of the foot.     No underlying drainable fluid collection is seen     There are no enhancing masses     No osteomyelitis.       Impression:      1. Open injury to the dorsum of the left foot. Air in the soft tissues  as well as edema but no drainable fluid collection  2. No osteomyelitis      This report was finalized on 9/15/2023 2:28 PM by Dr. Frahan Morrison MD.               Cultures:  No results found for: BLOODCX, URINECX, WOUNDCX, MRSACX, RESPCX, STOOLCX    Last echocardiogram:  Results for orders placed during the hospital encounter of 03/12/20    Adult Transthoracic Echo Complete W/ Cont if Necessary Per Protocol    Interpretation Summary  · Left ventricular systolic function is normal with normal left ventricular wall motion and systolic function,  EF 66-70%.  · Trace tricuspid valve regurgitation  · Suspicion of calcification of the pericardial sac          I have personally reviewed the above radiology images and read the final radiology report on 09/15/23  ---------------------------------------------------------------------------------------------------------------------  Assessment / Plan     There are no active hospital problems to display for this patient.      ASSESSMENT/PLAN:    Right foot wound with surrounding cellulitis, POA  Patient presents for further evaluation of a nonhealing wound which she believes began as a spider bite.  She has been receiving wound care via her PCPs office without improvement and therefore was advised to seek care in the ED.  CRP is elevated at 17.45.  No leukocytosis, no fever.  CT of the right lower extremity notes open wound with air in the soft tissues and edema with no drainable fluid collection.  She received Zosyn and vancomycin in the ED  We will admit to [facility] for further management  Consult general surgery and infectious disease for further assistance. Input is appreciated.   Continue antimicrobial coverage with vanc and zosyn for now. Pharmacy assistance with dosing is appreciated.   Wound culture ordered  NPO at midnight pending possible surgical debridement.  Neurovascular checks q4 hours  Continue to provide supportive care   Wound care as needed.  Repeat labs in the AM.    Hypokalemia, POA  Potassium 3.3 on arrival.  Replaced x1 in the ED.  Continue to monitor and replace electrolytes as needed.    Chronic:   Polysubstance abuse  Hx Aortic valve replacement  Hx CHF  Hypertension  Plan to restart home meds as indicated pending med rec  Monitor vital signs per protocol   Tox screen positive for opiates, buprenorphine, benzodiazepine, and THC  She denies injecting IV drugs  ----------  -DVT prophylaxis: Lovenox   -Activity: Ad elly  -Expected length of stay: INPATIENT status due to the need for care which can  only be reasonably provided in an hospital setting such as aggressive/expedited ancillary services and/or consultation services, the necessity for IV medications, close physician monitoring and/or the possible need for procedures.  In such, I feel patient’s risk for adverse outcomes and need for care warrant INPATIENT evaluation and predict the patient’s care encounter to likely last beyond 2 midnights.   -Disposition Pending course    High risk secondary to foot wound with cellulitis     There are no questions and answers to display.       Juan Pablo Orozco PA-C   09/15/23  15:10 EDT

## 2023-09-16 ENCOUNTER — ANESTHESIA (OUTPATIENT)
Dept: PERIOP | Facility: HOSPITAL | Age: 33
DRG: 603 | End: 2023-09-16
Payer: COMMERCIAL

## 2023-09-16 ENCOUNTER — ANESTHESIA EVENT (OUTPATIENT)
Dept: PERIOP | Facility: HOSPITAL | Age: 33
DRG: 603 | End: 2023-09-16
Payer: COMMERCIAL

## 2023-09-16 LAB
ALBUMIN SERPL-MCNC: 2.6 G/DL (ref 3.5–5.2)
ALBUMIN/GLOB SERPL: 0.7 G/DL
ALP SERPL-CCNC: 76 U/L (ref 39–117)
ALT SERPL W P-5'-P-CCNC: 8 U/L (ref 1–33)
ANION GAP SERPL CALCULATED.3IONS-SCNC: 6.9 MMOL/L (ref 5–15)
AST SERPL-CCNC: 14 U/L (ref 1–32)
BASOPHILS # BLD AUTO: 0.04 10*3/MM3 (ref 0–0.2)
BASOPHILS NFR BLD AUTO: 0.4 % (ref 0–1.5)
BILIRUB SERPL-MCNC: 0.3 MG/DL (ref 0–1.2)
BUN SERPL-MCNC: 4 MG/DL (ref 6–20)
BUN/CREAT SERPL: 7.8 (ref 7–25)
CALCIUM SPEC-SCNC: 9 MG/DL (ref 8.6–10.5)
CHLORIDE SERPL-SCNC: 107 MMOL/L (ref 98–107)
CO2 SERPL-SCNC: 24.1 MMOL/L (ref 22–29)
CREAT SERPL-MCNC: 0.51 MG/DL (ref 0.57–1)
DEPRECATED RDW RBC AUTO: 41.2 FL (ref 37–54)
EGFRCR SERPLBLD CKD-EPI 2021: 127.4 ML/MIN/1.73
EOSINOPHIL # BLD AUTO: 0.13 10*3/MM3 (ref 0–0.4)
EOSINOPHIL NFR BLD AUTO: 1.3 % (ref 0.3–6.2)
ERYTHROCYTE [DISTWIDTH] IN BLOOD BY AUTOMATED COUNT: 12.2 % (ref 12.3–15.4)
GLOBULIN UR ELPH-MCNC: 3.9 GM/DL
GLUCOSE SERPL-MCNC: 96 MG/DL (ref 65–99)
HCT VFR BLD AUTO: 33.3 % (ref 34–46.6)
HGB BLD-MCNC: 10.7 G/DL (ref 12–15.9)
IMM GRANULOCYTES # BLD AUTO: 0.03 10*3/MM3 (ref 0–0.05)
IMM GRANULOCYTES NFR BLD AUTO: 0.3 % (ref 0–0.5)
LYMPHOCYTES # BLD AUTO: 3.22 10*3/MM3 (ref 0.7–3.1)
LYMPHOCYTES NFR BLD AUTO: 33.4 % (ref 19.6–45.3)
MCH RBC QN AUTO: 29.6 PG (ref 26.6–33)
MCHC RBC AUTO-ENTMCNC: 32.1 G/DL (ref 31.5–35.7)
MCV RBC AUTO: 92.2 FL (ref 79–97)
MONOCYTES # BLD AUTO: 0.72 10*3/MM3 (ref 0.1–0.9)
MONOCYTES NFR BLD AUTO: 7.5 % (ref 5–12)
NEUTROPHILS NFR BLD AUTO: 5.5 10*3/MM3 (ref 1.7–7)
NEUTROPHILS NFR BLD AUTO: 57.1 % (ref 42.7–76)
NRBC BLD AUTO-RTO: 0 /100 WBC (ref 0–0.2)
PLATELET # BLD AUTO: 315 10*3/MM3 (ref 140–450)
PMV BLD AUTO: 9.4 FL (ref 6–12)
POTASSIUM SERPL-SCNC: 4.4 MMOL/L (ref 3.5–5.2)
PROT SERPL-MCNC: 6.5 G/DL (ref 6–8.5)
RBC # BLD AUTO: 3.61 10*6/MM3 (ref 3.77–5.28)
SODIUM SERPL-SCNC: 138 MMOL/L (ref 136–145)
WBC NRBC COR # BLD: 9.64 10*3/MM3 (ref 3.4–10.8)

## 2023-09-16 PROCEDURE — 25010000002 HYDROMORPHONE 1 MG/ML SOLUTION: Performed by: SURGERY

## 2023-09-16 PROCEDURE — 87070 CULTURE OTHR SPECIMN AEROBIC: CPT | Performed by: INTERNAL MEDICINE

## 2023-09-16 PROCEDURE — 87077 CULTURE AEROBIC IDENTIFY: CPT | Performed by: INTERNAL MEDICINE

## 2023-09-16 PROCEDURE — 25010000002 PROPOFOL 200 MG/20ML EMULSION: Performed by: NURSE ANESTHETIST, CERTIFIED REGISTERED

## 2023-09-16 PROCEDURE — 25010000002 ONDANSETRON PER 1 MG: Performed by: NURSE ANESTHETIST, CERTIFIED REGISTERED

## 2023-09-16 PROCEDURE — 25010000002 MIDAZOLAM PER 1 MG: Performed by: NURSE ANESTHETIST, CERTIFIED REGISTERED

## 2023-09-16 PROCEDURE — 85025 COMPLETE CBC W/AUTO DIFF WBC: CPT | Performed by: INTERNAL MEDICINE

## 2023-09-16 PROCEDURE — 99232 SBSQ HOSP IP/OBS MODERATE 35: CPT | Performed by: SURGERY

## 2023-09-16 PROCEDURE — 25010000002 PIPERACILLIN SOD-TAZOBACTAM PER 1 G: Performed by: INTERNAL MEDICINE

## 2023-09-16 PROCEDURE — 10060 I&D ABSCESS SIMPLE/SINGLE: CPT | Performed by: SURGERY

## 2023-09-16 PROCEDURE — 25010000002 KETOROLAC TROMETHAMINE PER 15 MG: Performed by: NURSE ANESTHETIST, CERTIFIED REGISTERED

## 2023-09-16 PROCEDURE — 99253 IP/OBS CNSLTJ NEW/EST LOW 45: CPT | Performed by: NURSE PRACTITIONER

## 2023-09-16 PROCEDURE — 25010000002 MORPHINE PER 10 MG: Performed by: INTERNAL MEDICINE

## 2023-09-16 PROCEDURE — 25010000002 HYDROMORPHONE 1 MG/ML SOLUTION: Performed by: NURSE ANESTHETIST, CERTIFIED REGISTERED

## 2023-09-16 PROCEDURE — 0J9Q0ZZ DRAINAGE OF RIGHT FOOT SUBCUTANEOUS TISSUE AND FASCIA, OPEN APPROACH: ICD-10-PCS | Performed by: SURGERY

## 2023-09-16 PROCEDURE — 87205 SMEAR GRAM STAIN: CPT | Performed by: INTERNAL MEDICINE

## 2023-09-16 PROCEDURE — 25010000002 PIPERACILLIN SOD-TAZOBACTAM PER 1 G: Performed by: SURGERY

## 2023-09-16 PROCEDURE — 80053 COMPREHEN METABOLIC PANEL: CPT | Performed by: INTERNAL MEDICINE

## 2023-09-16 PROCEDURE — 25010000002 VANCOMYCIN 5 G RECONSTITUTED SOLUTION: Performed by: SURGERY

## 2023-09-16 PROCEDURE — 25010000002 FENTANYL CITRATE (PF) 50 MCG/ML SOLUTION: Performed by: NURSE ANESTHETIST, CERTIFIED REGISTERED

## 2023-09-16 PROCEDURE — 87186 SC STD MICRODIL/AGAR DIL: CPT | Performed by: INTERNAL MEDICINE

## 2023-09-16 DEVICE — ABSORBABLE HEMOSTAT (OXIDIZED REGENERATED CELLULOSE)
Type: IMPLANTABLE DEVICE | Site: FOOT | Status: FUNCTIONAL
Brand: SURGICEL NU-KNIT

## 2023-09-16 RX ORDER — MORPHINE SULFATE 2 MG/ML
2 INJECTION, SOLUTION INTRAMUSCULAR; INTRAVENOUS EVERY 4 HOURS PRN
Status: DISCONTINUED | OUTPATIENT
Start: 2023-09-16 | End: 2023-09-18 | Stop reason: HOSPADM

## 2023-09-16 RX ORDER — MEPERIDINE HYDROCHLORIDE 25 MG/ML
12.5 INJECTION INTRAMUSCULAR; INTRAVENOUS; SUBCUTANEOUS
Status: DISCONTINUED | OUTPATIENT
Start: 2023-09-16 | End: 2023-09-16 | Stop reason: HOSPADM

## 2023-09-16 RX ORDER — ONDANSETRON 2 MG/ML
4 INJECTION INTRAMUSCULAR; INTRAVENOUS AS NEEDED
Status: DISCONTINUED | OUTPATIENT
Start: 2023-09-16 | End: 2023-09-16 | Stop reason: HOSPADM

## 2023-09-16 RX ORDER — FENTANYL CITRATE 50 UG/ML
INJECTION, SOLUTION INTRAMUSCULAR; INTRAVENOUS AS NEEDED
Status: DISCONTINUED | OUTPATIENT
Start: 2023-09-16 | End: 2023-09-16 | Stop reason: SURG

## 2023-09-16 RX ORDER — GABAPENTIN 300 MG/1
600 CAPSULE ORAL EVERY 8 HOURS SCHEDULED
Status: DISCONTINUED | OUTPATIENT
Start: 2023-09-16 | End: 2023-09-18 | Stop reason: HOSPADM

## 2023-09-16 RX ORDER — PROPOFOL 10 MG/ML
INJECTION, EMULSION INTRAVENOUS AS NEEDED
Status: DISCONTINUED | OUTPATIENT
Start: 2023-09-16 | End: 2023-09-16 | Stop reason: SURG

## 2023-09-16 RX ORDER — SODIUM CHLORIDE, SODIUM LACTATE, POTASSIUM CHLORIDE, CALCIUM CHLORIDE 600; 310; 30; 20 MG/100ML; MG/100ML; MG/100ML; MG/100ML
100 INJECTION, SOLUTION INTRAVENOUS ONCE AS NEEDED
Status: DISCONTINUED | OUTPATIENT
Start: 2023-09-16 | End: 2023-09-16 | Stop reason: HOSPADM

## 2023-09-16 RX ORDER — OXYCODONE HYDROCHLORIDE AND ACETAMINOPHEN 5; 325 MG/1; MG/1
1 TABLET ORAL ONCE AS NEEDED
Status: DISCONTINUED | OUTPATIENT
Start: 2023-09-16 | End: 2023-09-16 | Stop reason: HOSPADM

## 2023-09-16 RX ORDER — KETOROLAC TROMETHAMINE 30 MG/ML
INJECTION, SOLUTION INTRAMUSCULAR; INTRAVENOUS AS NEEDED
Status: DISCONTINUED | OUTPATIENT
Start: 2023-09-16 | End: 2023-09-16 | Stop reason: SURG

## 2023-09-16 RX ORDER — HYDROCODONE BITARTRATE AND ACETAMINOPHEN 5; 325 MG/1; MG/1
1 TABLET ORAL ONCE AS NEEDED
Status: COMPLETED | OUTPATIENT
Start: 2023-09-16 | End: 2023-09-16

## 2023-09-16 RX ORDER — SODIUM CHLORIDE, SODIUM LACTATE, POTASSIUM CHLORIDE, CALCIUM CHLORIDE 600; 310; 30; 20 MG/100ML; MG/100ML; MG/100ML; MG/100ML
INJECTION, SOLUTION INTRAVENOUS CONTINUOUS PRN
Status: DISCONTINUED | OUTPATIENT
Start: 2023-09-16 | End: 2023-09-16 | Stop reason: SURG

## 2023-09-16 RX ORDER — ONDANSETRON 2 MG/ML
INJECTION INTRAMUSCULAR; INTRAVENOUS AS NEEDED
Status: DISCONTINUED | OUTPATIENT
Start: 2023-09-16 | End: 2023-09-16 | Stop reason: SURG

## 2023-09-16 RX ORDER — MAGNESIUM HYDROXIDE 1200 MG/15ML
LIQUID ORAL AS NEEDED
Status: DISCONTINUED | OUTPATIENT
Start: 2023-09-16 | End: 2023-09-16 | Stop reason: HOSPADM

## 2023-09-16 RX ORDER — MIDAZOLAM HYDROCHLORIDE 1 MG/ML
INJECTION INTRAMUSCULAR; INTRAVENOUS AS NEEDED
Status: DISCONTINUED | OUTPATIENT
Start: 2023-09-16 | End: 2023-09-16 | Stop reason: SURG

## 2023-09-16 RX ORDER — IPRATROPIUM BROMIDE AND ALBUTEROL SULFATE 2.5; .5 MG/3ML; MG/3ML
3 SOLUTION RESPIRATORY (INHALATION) ONCE AS NEEDED
Status: DISCONTINUED | OUTPATIENT
Start: 2023-09-16 | End: 2023-09-16 | Stop reason: HOSPADM

## 2023-09-16 RX ORDER — KETOROLAC TROMETHAMINE 30 MG/ML
30 INJECTION, SOLUTION INTRAMUSCULAR; INTRAVENOUS EVERY 6 HOURS PRN
Status: DISCONTINUED | OUTPATIENT
Start: 2023-09-16 | End: 2023-09-16 | Stop reason: HOSPADM

## 2023-09-16 RX ADMIN — DOCUSATE SODIUM 50 MG AND SENNOSIDES 8.6 MG 2 TABLET: 8.6; 5 TABLET, FILM COATED ORAL at 21:04

## 2023-09-16 RX ADMIN — ONDANSETRON 4 MG: 2 INJECTION INTRAMUSCULAR; INTRAVENOUS at 10:00

## 2023-09-16 RX ADMIN — GABAPENTIN 600 MG: 300 CAPSULE ORAL at 13:47

## 2023-09-16 RX ADMIN — HYDROMORPHONE HYDROCHLORIDE 1 MG: 1 INJECTION, SOLUTION INTRAMUSCULAR; INTRAVENOUS; SUBCUTANEOUS at 11:04

## 2023-09-16 RX ADMIN — GABAPENTIN 600 MG: 300 CAPSULE ORAL at 21:54

## 2023-09-16 RX ADMIN — PIPERACILLIN SODIUM AND TAZOBACTAM SODIUM 3.38 G: 3; .375 INJECTION, POWDER, LYOPHILIZED, FOR SOLUTION INTRAVENOUS at 22:45

## 2023-09-16 RX ADMIN — MUPIROCIN 1 APPLICATION: 20 OINTMENT TOPICAL at 21:04

## 2023-09-16 RX ADMIN — MIDAZOLAM HYDROCHLORIDE 2 MG: 1 INJECTION, SOLUTION INTRAMUSCULAR; INTRAVENOUS at 10:00

## 2023-09-16 RX ADMIN — Medication 10 ML: at 21:04

## 2023-09-16 RX ADMIN — KETOROLAC TROMETHAMINE 30 MG: 30 INJECTION, SOLUTION INTRAMUSCULAR at 10:00

## 2023-09-16 RX ADMIN — MIDAZOLAM HYDROCHLORIDE 2 MG: 1 INJECTION, SOLUTION INTRAMUSCULAR; INTRAVENOUS at 10:08

## 2023-09-16 RX ADMIN — MORPHINE SULFATE 2 MG: 2 INJECTION, SOLUTION INTRAMUSCULAR; INTRAVENOUS at 14:22

## 2023-09-16 RX ADMIN — VANCOMYCIN HYDROCHLORIDE 1250 MG: 500 INJECTION, POWDER, LYOPHILIZED, FOR SOLUTION INTRAVENOUS at 15:37

## 2023-09-16 RX ADMIN — MUPIROCIN 1 APPLICATION: 20 OINTMENT TOPICAL at 11:15

## 2023-09-16 RX ADMIN — FENTANYL CITRATE 200 MCG: 50 INJECTION INTRAMUSCULAR; INTRAVENOUS at 10:03

## 2023-09-16 RX ADMIN — HYDROMORPHONE HYDROCHLORIDE 1 MG: 1 INJECTION, SOLUTION INTRAMUSCULAR; INTRAVENOUS; SUBCUTANEOUS at 08:03

## 2023-09-16 RX ADMIN — PIPERACILLIN SODIUM AND TAZOBACTAM SODIUM 3.38 G: 3; .375 INJECTION, POWDER, LYOPHILIZED, FOR SOLUTION INTRAVENOUS at 13:47

## 2023-09-16 RX ADMIN — PROPOFOL 200 MG: 10 INJECTION, EMULSION INTRAVENOUS at 10:05

## 2023-09-16 RX ADMIN — FENTANYL CITRATE 100 MCG: 50 INJECTION INTRAMUSCULAR; INTRAVENOUS at 10:00

## 2023-09-16 RX ADMIN — FENTANYL CITRATE 100 MCG: 50 INJECTION INTRAMUSCULAR; INTRAVENOUS at 10:12

## 2023-09-16 RX ADMIN — VANCOMYCIN HYDROCHLORIDE 1250 MG: 500 INJECTION, POWDER, LYOPHILIZED, FOR SOLUTION INTRAVENOUS at 21:54

## 2023-09-16 RX ADMIN — FENTANYL CITRATE 100 MCG: 50 INJECTION INTRAMUSCULAR; INTRAVENOUS at 10:15

## 2023-09-16 RX ADMIN — HYDROCODONE BITARTRATE AND ACETAMINOPHEN 1 TABLET: 5; 325 TABLET ORAL at 00:07

## 2023-09-16 RX ADMIN — PIPERACILLIN SODIUM AND TAZOBACTAM SODIUM 3.38 G: 3; .375 INJECTION, POWDER, LYOPHILIZED, FOR SOLUTION INTRAVENOUS at 05:39

## 2023-09-16 RX ADMIN — MORPHINE SULFATE 2 MG: 2 INJECTION, SOLUTION INTRAMUSCULAR; INTRAVENOUS at 21:54

## 2023-09-16 RX ADMIN — SODIUM CHLORIDE, POTASSIUM CHLORIDE, SODIUM LACTATE AND CALCIUM CHLORIDE: 600; 310; 30; 20 INJECTION, SOLUTION INTRAVENOUS at 10:00

## 2023-09-16 NOTE — ANESTHESIA PREPROCEDURE EVALUATION
Anesthesia Evaluation     Patient summary reviewed and Nursing notes reviewed   NPO Solid Status: Waived due to emergency  NPO Liquid Status: Waived due to emergency           Airway   Mallampati: II  TM distance: <3 FB  Neck ROM: full  no difficulty expected  Dental    (+) poor dentition    Pulmonary    (+) a smoker Current Abstained day of surgery,shortness of breath, decreased breath sounds  Cardiovascular - normal exam  Exercise tolerance: unable to assess    NYHA Classification: II    (+) hypertension poorly controlled, valvular problems/murmurs, CHF       Neuro/Psych  (+) psychiatric history Anxiety and Depression  GI/Hepatic/Renal/Endo    (+) hepatitis C, liver disease history of elevated LFT    Musculoskeletal (-) negative ROS    Abdominal  - normal exam   Substance History   (+) drug use     OB/GYN negative ob/gyn ROS         Other - negative ROS       ROS/Med Hx Other: Valve replacement age 8, followed by Dr. Alexis when needed                Anesthesia Plan    ASA 3 - emergent     general     intravenous induction     Anesthetic plan, risks, benefits, and alternatives have been provided, discussed and informed consent has been obtained with: patient.  Pre-procedure education provided    CODE STATUS:    Code Status (Patient has no pulse and is not breathing): CPR (Attempt to Resuscitate)  Medical Interventions (Patient has pulse or is breathing): Full Support

## 2023-09-16 NOTE — PLAN OF CARE
Goal Outcome Evaluation:  Plan of Care Reviewed With: patient           Outcome Evaluation: Patient is currently off unit with S/O. Patient c/o pain, see mar. Dressing CDI. Will continue POC

## 2023-09-16 NOTE — PROGRESS NOTES
Patient Identification:  Name:  Micthell Jay  Age:  33 y.o.  Sex:  female  :  1990  MRN:  [id number]  Visit Number:  [id number]  Primary Care Provider:  Kadi Archer APRN    Length of stay:  1    Subjective/Interval History/Consultants/Procedures     Chief Complaint:   Chief Complaint   Patient presents with    Wound Infection     PT HAS A LARGE 4 CM IN DIAMETER WOULD ON HER RIGHT FOOT WITH DISCOLORATION THAT HAS BEEN PACKED FROM HER DR OFFICE        Subjective/Interval History:    33 y.o. female who was admitted on 9/15/2023 with open wound of the right lower extremity     PMH is significant for polysubstance abuse, history of aortic valve replacement, Hx CHF, hypertension   For complete admission information, please see history and physical.     Consultations:  General surgery     Procedures/Scans:  Bedside debridement 9/15/23- Dr. Berry   I&D 23- Dr. Berry  CT right lower extremity w/ contrast   US arterial doppler, RLE    Today, the patient seen postoperatively with her spouse at the bedside. She states doing well, having some minor pain currently. No new complaints noted. ID has seen, expects need for long term abx. CTA to further evaluate given abnormal arterial doppler.     Room location at the time of evaluation was 334.    ----------------------------------------------------------------------------------------------------------------------  Current Hospital Meds:  [MAR Hold] buprenorphine-naloxone, 1.75 tablet, Sublingual, Daily  enoxaparin, 40 mg, Subcutaneous, Daily  [MAR Hold] mupirocin, 1 application , Topical, BID  nicotine, 1 patch, Transdermal, Once  piperacillin-tazobactam, 3.375 g, Intravenous, Q8H  senna-docusate sodium, 2 tablet, Oral, BID  sodium chloride, 10 mL, Intravenous, Q12H  vancomycin, 1,250 mg, Intravenous, Q12H         ----------------------------------------------------------------------------------------------------------------------      Objective      Vital Signs:  Temp:  [98.1 °F (36.7 °C)-99.9 °F (37.7 °C)] 98.1 °F (36.7 °C)  Heart Rate:  [] 72  Resp:  [16-26] 26  BP: (103-148)/() 135/80      09/15/23  1212 09/15/23  1707   Weight: 45.8 kg (101 lb) 53.8 kg (118 lb 9.7 oz)     Body mass index is 17.52 kg/m².    Intake/Output Summary (Last 24 hours) at 9/16/2023 1007  Last data filed at 9/15/2023 2308  Gross per 24 hour   Intake 2190 ml   Output --   Net 2190 ml     No intake/output data recorded.  NPO Diet NPO Type: Strict NPO  ----------------------------------------------------------------------------------------------------------------------    Physical Exam  Vitals and nursing note reviewed.   Constitutional:       General: She is not in acute distress.  HENT:      Head: Normocephalic and atraumatic.   Eyes:      Extraocular Movements: Extraocular movements intact.      Conjunctiva/sclera: Conjunctivae normal.   Cardiovascular:      Rate and Rhythm: Normal rate and regular rhythm.   Pulmonary:      Effort: Pulmonary effort is normal. No respiratory distress.   Musculoskeletal:      Right lower leg: Edema present.   Skin:     General: Skin is warm and dry.      Comments: Ace wrap to R foot wound   Neurological:      Mental Status: She is alert. Mental status is at baseline.   Psychiatric:         Mood and Affect: Mood normal.         Behavior: Behavior normal.              ----------------------------------------------------------------------------------------------------------------------  Tele:      ----------------------------------------------------------------------------------------------------------------------      Results from last 7 days   Lab Units 09/16/23  0523 09/15/23  1244   CRP mg/dL  --  17.45*   LACTATE mmol/L  --  1.2   WBC 10*3/mm3 9.64 10.77   HEMOGLOBIN g/dL 10.7* 12.5   HEMATOCRIT % 33.3* 37.4   MCV fL 92.2 89.3   MCHC g/dL 32.1 33.4   PLATELETS 10*3/mm3 315 325         Results from last 7 days   Lab Units  09/16/23  0523 09/15/23  1244   SODIUM mmol/L 138 133*   POTASSIUM mmol/L 4.4 3.3*   CHLORIDE mmol/L 107 96*   CO2 mmol/L 24.1 26.3   BUN mg/dL 4* 4*   CREATININE mg/dL 0.51* 0.57   CALCIUM mg/dL 9.0 9.3   GLUCOSE mg/dL 96 97   ALBUMIN g/dL 2.6* 3.4*   BILIRUBIN mg/dL 0.3 0.4   ALK PHOS U/L 76 86   AST (SGOT) U/L 14 12   ALT (SGPT) U/L 8 8   Estimated Creatinine Clearance: 134.5 mL/min (A) (by C-G formula based on SCr of 0.51 mg/dL (L)).  No results found for: AMMONIA      No results found for: BLOODCX  No results found for: URINECX  No results found for: WOUNDCX  No results found for: STOOLCX  ----------------------------------------------------------------------------------------------------------------------  Imaging Results (Last 24 Hours)       Procedure Component Value Units Date/Time    US Arterial Doppler Lower Extremity Right [[id number]] Collected: 09/15/23 1810     Updated: 09/15/23 1814    Narrative:      Procedure: Arterial duplex ultrasound evaluation of the right lower  extremity performed on 09/15/2023. Color flow imaging performed.  Grayscale imaging performed. Waveform analysis performed.     HISTORY: Necrotic wound. Nonpalpable pulses.     FINDINGS:     Monophasic waveform at the right external iliac artery.  Monophasic waveform at the right common femoral artery.  Monophasic waveforms at the right superficial femoral artery, right  popliteal artery, and right posterior tibial artery. Elevated peak  systolic velocities identified throughout the right lower extremity.  No occluded arterial segment.       Impression:         1.  Moderate to severe peripheral arterial vascular disease with  monophasic waveforms identified throughout the right lower extremity.  2.  Elevated peak systolic velocities identified throughout the right  lower extremity likely due to underlying multiple stenoses.  3.  No occluded arterial segment.     This report was finalized on 9/15/2023 6:12 PM by Benigno Pettit MD.        CT Lower Extremity Left With Contrast [[id number]] Collected: 09/15/23 1427     Updated: 09/15/23 1430    Narrative:      EXAMINATION: CT LOWER EXTREMITY LEFT W CONTRAST-      CLINICAL INDICATION: Left foot        COMPARISON: None available.     Radiation dose reduction techniques were utilized per ALARA protocol.  Automated exposure control was initiated through either or Sapient or  Bloxr software packages by  protocol.           PROCEDURE:  Axial images through the left foot were acquired during IV contrast.  Reformatted images were created.     FINDINGS:  There is soft tissue injury with air in the soft tissues as well as  edema in the dorsum of the foot.     No underlying drainable fluid collection is seen     There are no enhancing masses     No osteomyelitis.       Impression:      1. Open injury to the dorsum of the left foot. Air in the soft tissues  as well as edema but no drainable fluid collection  2. No osteomyelitis      This report was finalized on 9/15/2023 2:28 PM by Dr. Farhan Morrison MD.             ----------------------------------------------------------------------------------------------------------------------   I have reviewed the above laboratory values for 09/16/23    Assessment/Plan     Active Hospital Problems    Diagnosis  POA    **Open wound of right lower extremity [S81.801A]  Yes         ASSESSMENT/PLAN:    Open wound of right lower extremity   Cellulitis of right foot  Patient presented for further evaluation of a nonhealing wound which she believes began as a spider bite.  She has been receiving wound care via her PCPs office without improvement and therefore was advised to seek care in the ED.  CRP was elevated at 17.45.  No leukocytosis, no fever on arrival.  CT of the right lower extremity notes open wound with air in the soft tissues and edema with no drainable fluid collection.  Admitted to [facility]  Continuing coverage with Vanc and zosyn  for now.   General surgery consulted and following. Assistance is appreciated.   Patient underwent bedside debridement yesterday, I&D this AM.   Wound culture is pending.   Infectious disease to see. Assistance appreciated. Anticipate need for extended course of antibiotics.  AM labs stable, no leukocytosis. VSS, no fevers overnight.   Arterial US of RLE did note concern for significant PAD with likely multiple underlying stenoses.Will further assess with CTA lower extremity  Continue to provide supportive care     Hypokalemia, POA, now resolved  Potassium 3.3 on arrival.  Replaced x1 in the ED.  Continue to monitor and replace electrolytes as needed.     Chronic:   Polysubstance abuse  Hx Aortic valve replacement  Hx CHF  Hypertension  Plan to restart home meds as indicated pending med rec  Monitor vital signs per protocol   Tox screen positive for opiates, buprenorphine, benzodiazepine, and THC  She denies injecting IV drugs    -----------  -DVT prophylaxis: Lovenox   -Disposition plans/anticipated needs: Pending course and further work up         The patient is high risk due to the following diagnoses/reasons:  Open wound right lower extremity         Juan Pablo Orozco PA-C  09/16/23  10:07 EDT

## 2023-09-16 NOTE — PAYOR COMM NOTE
"[facility]  GURINDER HDZ  PHONE  [phone]  FAX  [phone]  NPI:  [id number]    REQUEST FOR INPATIENT AUTH    Mitchell Monahan (33 y.o. Female)       Date of Birth   1990    Social Security Number       Address   [address] [place] [id number]    Home Phone   [phone]    MRN   [id number]       [facility]    Marital Status                               Admission Date   9/15/23    Admission Type   Emergency    Admitting Provider   Mamadou Johnson DO    Attending Provider   Mamadou Johnson DO    Department, Room/Bed   [address], 3334/1P       Discharge Date       Discharge Disposition       Discharge Destination                                 Attending Provider: Mamadou Johnson DO    Allergies: Sulfamethoxazole, Trimethoprim    Isolation: None   Infection: None   Code Status: CPR    Ht: 175.3 cm (69\")   Wt: 53.8 kg (118 lb 9.7 oz)    Admission Cmt: None   Principal Problem: Open wound of right lower extremity [S81.801A]                   Active Insurance as of 9/15/2023       Primary Coverage       Payor Plan Insurance Group Employer/Plan Group    [facility] [facility] [employer] [id number]       Payor Plan Address Payor Plan Phone Number Payor Plan Fax Number Effective Dates    [address] [address]   1/1/2021 - None Entered    [facility] [id number]         Subscriber Name Subscriber Birth Date Member ID       MITCHELL MONAHAN 1990 [id number]                     Emergency Contacts        (Rel.) Home Phone Work Phone Mobile Phone    SWATHI MONAHAN (Spouse) [phone] -- --                 History & Physical        Juan Pablo Orozco PA-C at 09/15/23 1510       Attestation signed by Mamadou Johnson DO at 09/15/23 1819    I have reviewed this documentation and agree. Pt seen and examined with PRISCILA Morris. Awake and alert. Right foot currently bandaged with ACE wrap. S/P bedside debridement " per surgery with possible additional cleanout in the OR tomorrow per surgery. Cont broad spectrum IV abx. Surgery input appreciated.                       [facility] Medicine Services  History & Physical    Patient Identification:  Name:  Mitchell Jay  Age:  33 y.o.  Sex:  female  :  1990  MRN:  [id number]   Visit Number:  [id number]  Admit Date: 9/15/2023   Primary Care Physician:  Kadi Archer APRN    Subjective     Chief complaint: Wound infection    History of presenting illness:      Mitchell Jay is a 33 y.o. female who presented for further evaluation of right foot wound. Patient reports wound has been present for approximately 7 days. She notes it initially started as a black, raised nodule with associated sharp, stabbing pain. She initially saw her PCP who suspected a possible spider bite. Ultimately had the wound lanced and packed two days ago. She states her PCP expressed white, purulent material. She notes the wound has continued to drain following, mostly bloody. On follow up with PCP she was advised to seek care in the ED. She denies any associated fever or chills. Further review of systems negative otherwise. Patient does admit history of substance abuse including IVDA but denies use of any injectable drugs recently. She does admit to taking benzodiazepines that are not prescribed to her since onset of foot pain to help her sleep. She is a 2 PPD smoker. She has a history of heart problems, states she had her aortic valve replaced when she was a child. She thinks it is a porcine valve. She also reports a history of heart failure.     Past medical history is significant for polysubstance abuse, history of aortic valve replacement, Hx CHF, hypertension    Upon arrival to the ED, vital signs were temp 99.9, heart rate 76, respirations 17, /94, SPO2 99% on room air.  Laboratory work-up significant for potassium low at 3.3.  CRP is 17.45.  There is no  leukocytosis or left shift.  Tox screen positive for benzodiazepines, buprenorphine, opiates, THC.  CT of the right lower extremity notes open injury to the dorsum of the right foot with air in the soft tissues as well as edema, no drainable fluid collection.  No osteomyelitis.    Known Emergency Department medications received prior to my evaluation included Norco, NicoDerm, Zosyn, potassium chloride, vancomycin.   Emergency Department Room location at the time of my evaluation was [facility].     ---------------------------------------------------------------------------------------------------------------------   Review of Systems   Constitutional:  Negative for chills and fever.   HENT:  Negative for congestion and rhinorrhea.    Respiratory:  Negative for cough and shortness of breath.    Cardiovascular:  Positive for palpitations (chronic). Negative for chest pain.   Gastrointestinal:  Negative for abdominal pain, constipation, diarrhea, nausea and vomiting.   Genitourinary:  Negative for difficulty urinating and dysuria.   Musculoskeletal:  Negative for arthralgias and myalgias.   Skin:  Positive for color change and wound.   Neurological:  Negative for dizziness and light-headedness.      ---------------------------------------------------------------------------------------------------------------------   Past Medical History:   Diagnosis Date    Congestive heart failure     Hepatitis C     Hypertension      Past Surgical History:   Procedure Laterality Date     SECTION WITH TUBAL Bilateral 2017    Procedure:  SECTION REPEAT WITH TUBAL;  Surgeon: Jonas Wray MD;  Location: [facility] LABOR DELIVERY;  Service:      No family history on file.  Social History     Socioeconomic History    Marital status:    Tobacco Use    Smoking status: Every Day     Packs/day: 1.00     Types: Cigarettes    Smokeless tobacco: Never   Substance and Sexual Activity    Alcohol use: No    Drug use: Yes      Types: Benzodiazepines, Marijuana, Methamphetamines, Oxycodone    Sexual activity: Defer     Partners: Male     Birth control/protection: Surgical     ---------------------------------------------------------------------------------------------------------------------   Allergies:  Sulfamethoxazole and Trimethoprim  ---------------------------------------------------------------------------------------------------------------------   Home medications:    Medications below are reported home medications pulling from within the system; at this time, these medications have not been reconciled unless otherwise specified and are in the verification process for further verifcation as current home medications.  (Not in a hospital admission)      Hospital Scheduled Meds:  nicotine, 1 patch, Transdermal, Once           Current listed hospital scheduled medications may not yet reflect those currently placed in orders that are signed and held awaiting patient's arrival to floor.   ---------------------------------------------------------------------------------------------------------------------     Objective     Vital Signs:  Temp:  [99.9 °F (37.7 °C)] 99.9 °F (37.7 °C)  Heart Rate:  [68-76] 68  Resp:  [17] 17  BP: (109-111)/(74-94) 109/74      09/15/23  1212   Weight: 45.8 kg (101 lb)     Body mass index is 15.36 kg/m².  ---------------------------------------------------------------------------------------------------------------------       Physical Exam  Vitals and nursing note reviewed.   Constitutional:       General: She is not in acute distress.     Comments: Thin appearing   HENT:      Head: Normocephalic and atraumatic.   Eyes:      Extraocular Movements: Extraocular movements intact.      Conjunctiva/sclera: Conjunctivae normal.   Cardiovascular:      Rate and Rhythm: Normal rate and regular rhythm.      Heart sounds: Murmur heard.   Pulmonary:      Effort: Pulmonary effort is normal.      Breath sounds: Normal  breath sounds.   Abdominal:      Palpations: Abdomen is soft.      Tenderness: There is no abdominal tenderness.   Musculoskeletal:      Right lower leg: Edema (1+ pitting edema to the level of mid calf) present.   Skin:     General: Skin is warm and dry.      Findings: Erythema present.      Comments: Open wound to dorsum of right foot, just below the ankle. There is surrounding necrotic tissue. Erythema extends to the PIP of toes distally and just above the ankle. Edema to foot and lower leg.    Neurological:      Mental Status: She is alert. Mental status is at baseline.   Psychiatric:         Mood and Affect: Mood normal.         Behavior: Behavior normal.             ---------------------------------------------------------------------------------------------------------------------  EKG:      ---------------------------------------------------------------------------------------------------------------------   Results from last 7 days   Lab Units 09/15/23  1244   CRP mg/dL 17.45*   LACTATE mmol/L 1.2   WBC 10*3/mm3 10.77   HEMOGLOBIN g/dL 12.5   HEMATOCRIT % 37.4   MCV fL 89.3   MCHC g/dL 33.4   PLATELETS 10*3/mm3 325         Results from last 7 days   Lab Units 09/15/23  1244   SODIUM mmol/L 133*   POTASSIUM mmol/L 3.3*   CHLORIDE mmol/L 96*   CO2 mmol/L 26.3   BUN mg/dL 4*   CREATININE mg/dL 0.57   CALCIUM mg/dL 9.3   GLUCOSE mg/dL 97   ALBUMIN g/dL 3.4*   BILIRUBIN mg/dL 0.4   ALK PHOS U/L 86   AST (SGOT) U/L 12   ALT (SGPT) U/L 8   Estimated Creatinine Clearance: 102.4 mL/min (by C-G formula based on SCr of 0.57 mg/dL).  No results found for: AMMONIA          No results found for: HGBA1C  No results found for: TSH, FREET4  Lab Results   Component Value Date    HCGQUANT 38,979 (H) 06/10/2015     Pain Management Panel  More data may exist         Latest Ref Rng & Units 9/15/2023 11/8/2017   Pain Management Panel   Amphetamine, Urine Qual Negative Negative  Negative    Barbiturates Screen, Urine Negative  Negative  Negative    Benzodiazepine Screen, Urine Negative Positive  Negative    Buprenorphine, Screen, Urine Negative Positive  Positive    Cocaine Screen, Urine Negative Negative  Negative    Fentanyl, Urine Negative Negative  -   Methadone Screen , Urine Negative Negative  Negative    Methamphetamine, Ur Negative Negative  -     No results found for: BLOODCX  No results found for: URINECX  No results found for: WOUNDCX  No results found for: STOOLCX      ---------------------------------------------------------------------------------------------------------------------  Imaging Results (Last 7 Days)       Procedure Component Value Units Date/Time    CT Lower Extremity Left With Contrast [[id number]] Collected: 09/15/23 1427     Updated: 09/15/23 1430    Narrative:      EXAMINATION: CT LOWER EXTREMITY LEFT W CONTRAST-      CLINICAL INDICATION: Left foot        COMPARISON: None available.     Radiation dose reduction techniques were utilized per ALARA protocol.  Automated exposure control was initiated through either or Yasound or  DoseRight software packages by  protocol.           PROCEDURE:  Axial images through the left foot were acquired during IV contrast.  Reformatted images were created.     FINDINGS:  There is soft tissue injury with air in the soft tissues as well as  edema in the dorsum of the foot.     No underlying drainable fluid collection is seen     There are no enhancing masses     No osteomyelitis.       Impression:      1. Open injury to the dorsum of the left foot. Air in the soft tissues  as well as edema but no drainable fluid collection  2. No osteomyelitis      This report was finalized on 9/15/2023 2:28 PM by Dr. Farhan Morrison MD.               Cultures:  No results found for: BLOODCX, URINECX, WOUNDCX, MRSACX, RESPCX, STOOLCX    Last echocardiogram:  Results for orders placed during the hospital encounter of 03/12/20    Adult Transthoracic Echo Complete W/ Cont if Necessary  Per Protocol    Interpretation Summary  · Left ventricular systolic function is normal with normal left ventricular wall motion and systolic function, EF 66-70%.  · Trace tricuspid valve regurgitation  · Suspicion of calcification of the pericardial sac          I have personally reviewed the above radiology images and read the final radiology report on 09/15/23  ---------------------------------------------------------------------------------------------------------------------  Assessment / Plan     There are no active hospital problems to display for this patient.      ASSESSMENT/PLAN:    Right foot wound with surrounding cellulitis, POA  Patient presents for further evaluation of a nonhealing wound which she believes began as a spider bite.  She has been receiving wound care via her PCPs office without improvement and therefore was advised to seek care in the ED.  CRP is elevated at 17.45.  No leukocytosis, no fever.  CT of the right lower extremity notes open wound with air in the soft tissues and edema with no drainable fluid collection.  She received Zosyn and vancomycin in the ED  We will admit to [facility] for further management  Consult general surgery and infectious disease for further assistance. Input is appreciated.   Continue antimicrobial coverage with vanc and zosyn for now. Pharmacy assistance with dosing is appreciated.   Wound culture ordered  NPO at midnight pending possible surgical debridement.  Neurovascular checks q4 hours  Continue to provide supportive care   Wound care as needed.  Repeat labs in the AM.    Hypokalemia, POA  Potassium 3.3 on arrival.  Replaced x1 in the ED.  Continue to monitor and replace electrolytes as needed.    Chronic:   Polysubstance abuse  Hx Aortic valve replacement  Hx CHF  Hypertension  Plan to restart home meds as indicated pending med rec  Monitor vital signs per protocol   Tox screen positive for opiates, buprenorphine, benzodiazepine, and THC  She denies  injecting IV drugs  ----------  -DVT prophylaxis: Lovenox   -Activity: Ad elly  -Expected length of stay: INPATIENT status due to the need for care which can only be reasonably provided in an hospital setting such as aggressive/expedited ancillary services and/or consultation services, the necessity for IV medications, close physician monitoring and/or the possible need for procedures.  In such, I feel patient’s risk for adverse outcomes and need for care warrant INPATIENT evaluation and predict the patient’s care encounter to likely last beyond 2 midnights.   -Disposition Pending course    High risk secondary to foot wound with cellulitis     There are no questions and answers to display.       Juan Pablo Orozco PA-C   09/15/23  15:10 EDT     Electronically signed by Mamadou Johnson DO at 09/15/23 1819          Emergency Department Notes        Rachell Hicks, RN at 09/15/23 1604          Dr. Berry and Christofer KEEN at bedside doing debridement prior to pt going upstairs.     Electronically signed by Rachell Hicks RN at 09/15/23 1605       Juan Dorsey RN at 09/15/23 1555          PT ON 2 LITER O2 AND BOLUS STARTED FOR PROCEDURE WITH AMBU BAG AND SUCTION AT BEDSIDE     Electronically signed by Juan Dorsey RN at 09/15/23 1621       Juan Dorsey RN at 09/15/23 1555          Consent obtained for debridement. Dr. Berry at bedside.    Electronically signed by Juan Dorsey RN at 09/15/23 1601       Elena Noriega APRN at 09/15/23 1446       Attestation signed by Fernando Catalan MD at 09/15/23 1602        SUPERVISE: For this patient encounter, I reviewed the APC's documentation, treatment plan, and medical decision making.  Fernando Catalan MD 9/15/2023 16:02 EDT                         Subjective   History of Present Illness  Patient is a 33 year old female with PMH significant for CHF, hypertension, hepatitis C and substance abuse. She presents to the ED for R foot  "wound infection. She states that she believes this started out as a \"spider bite\". She has been seeing her PCP for this and they had been doing wound care and packing the wound. She reports drainage from the wound and pain. They advised her to come to the ED for further evaluation.       Review of Systems   Constitutional: Negative.  Negative for fever.   HENT: Negative.     Eyes: Negative.    Respiratory: Negative.     Cardiovascular: Negative.  Negative for chest pain.   Gastrointestinal: Negative.  Negative for abdominal pain.   Endocrine: Negative.    Genitourinary: Negative.  Negative for dysuria.   Skin:  Positive for wound.   Allergic/Immunologic: Negative.    Neurological: Negative.    Psychiatric/Behavioral: Negative.     All other systems reviewed and are negative.    Past Medical History:   Diagnosis Date    Congestive heart failure     Hepatitis C     Hypertension        Allergies   Allergen Reactions    Sulfamethoxazole Anaphylaxis    Trimethoprim Anaphylaxis       Past Surgical History:   Procedure Laterality Date     SECTION WITH TUBAL Bilateral 2017    Procedure:  SECTION REPEAT WITH TUBAL;  Surgeon: Jonas Wray MD;  Location: [facility] LABOR DELIVERY;  Service:        No family history on file.    Social History     Socioeconomic History    Marital status:    Tobacco Use    Smoking status: Every Day     Packs/day: 1.00     Types: Cigarettes    Smokeless tobacco: Never   Substance and Sexual Activity    Alcohol use: No    Drug use: Yes     Types: Benzodiazepines, Marijuana, Methamphetamines, Oxycodone    Sexual activity: Defer     Partners: Male     Birth control/protection: Surgical           Objective   Physical Exam  Vitals and nursing note reviewed.   Constitutional:       General: She is not in acute distress.     Appearance: She is well-developed. She is not diaphoretic.   HENT:      Head: Normocephalic and atraumatic.      Right Ear: External ear normal.      " Left Ear: External ear normal.      Nose: Nose normal.   Eyes:      Conjunctiva/sclera: Conjunctivae normal.      Pupils: Pupils are equal, round, and reactive to light.   Neck:      Vascular: No JVD.      Trachea: No tracheal deviation.   Cardiovascular:      Rate and Rhythm: Normal rate and regular rhythm.      Heart sounds: Normal heart sounds. No murmur heard.  Pulmonary:      Effort: Pulmonary effort is normal. No respiratory distress.      Breath sounds: Normal breath sounds. No wheezing.   Abdominal:      General: Bowel sounds are normal.      Palpations: Abdomen is soft.      Tenderness: There is no abdominal tenderness.   Musculoskeletal:         General: No deformity. Normal range of motion.      Cervical back: Normal range of motion and neck supple.   Skin:     General: Skin is warm and dry.      Coloration: Skin is not pale.      Findings: Wound present. No erythema or rash.      Comments: See photo in chart.   Neurological:      Mental Status: She is alert and oriented to person, place, and time.      Cranial Nerves: No cranial nerve deficit.   Psychiatric:         Behavior: Behavior normal.         Thought Content: Thought content normal.       Procedures              Results for orders placed or performed during the hospital encounter of 09/15/23   Comprehensive Metabolic Panel    Specimen: Blood   Result Value Ref Range    Glucose 97 65 - 99 mg/dL    BUN 4 (L) 6 - 20 mg/dL    Creatinine 0.57 0.57 - 1.00 mg/dL    Sodium 133 (L) 136 - 145 mmol/L    Potassium 3.3 (L) 3.5 - 5.2 mmol/L    Chloride 96 (L) 98 - 107 mmol/L    CO2 26.3 22.0 - 29.0 mmol/L    Calcium 9.3 8.6 - 10.5 mg/dL    Total Protein 7.4 6.0 - 8.5 g/dL    Albumin 3.4 (L) 3.5 - 5.2 g/dL    ALT (SGPT) 8 1 - 33 U/L    AST (SGOT) 12 1 - 32 U/L    Alkaline Phosphatase 86 39 - 117 U/L    Total Bilirubin 0.4 0.0 - 1.2 mg/dL    Globulin 4.0 gm/dL    A/G Ratio 0.9 g/dL    BUN/Creatinine Ratio 7.0 7.0 - 25.0    Anion Gap 10.7 5.0 - 15.0 mmol/L     eGFR 124.0 >60.0 mL/min/1.73   Lipase    Specimen: Blood   Result Value Ref Range    Lipase 14 13 - 60 U/L   Urinalysis With Microscopic If Indicated (No Culture) - Urine, Clean Catch    Specimen: Urine, Clean Catch   Result Value Ref Range    Color, UA Yellow Yellow, Straw    Appearance, UA Clear Clear    pH, UA 6.5 5.0 - 8.0    Specific Gravity, UA <=1.005 1.005 - 1.030    Glucose, UA Negative Negative    Ketones, UA Negative Negative    Bilirubin, UA Negative Negative    Blood, UA Negative Negative    Protein, UA Negative Negative    Leuk Esterase, UA Negative Negative    Nitrite, UA Negative Negative    Urobilinogen, UA 0.2 E.U./dL 0.2 - 1.0 E.U./dL   C-reactive Protein    Specimen: Blood   Result Value Ref Range    C-Reactive Protein 17.45 (H) 0.00 - 0.50 mg/dL   Lactic Acid, Plasma    Specimen: Blood   Result Value Ref Range    Lactate 1.2 0.5 - 2.0 mmol/L   CBC Auto Differential    Specimen: Blood   Result Value Ref Range    WBC 10.77 3.40 - 10.80 10*3/mm3    RBC 4.19 3.77 - 5.28 10*6/mm3    Hemoglobin 12.5 12.0 - 15.9 g/dL    Hematocrit 37.4 34.0 - 46.6 %    MCV 89.3 79.0 - 97.0 fL    MCH 29.8 26.6 - 33.0 pg    MCHC 33.4 31.5 - 35.7 g/dL    RDW 12.0 (L) 12.3 - 15.4 %    RDW-SD 38.8 37.0 - 54.0 fl    MPV 9.3 6.0 - 12.0 fL    Platelets 325 140 - 450 10*3/mm3    Neutrophil % 71.1 42.7 - 76.0 %    Lymphocyte % 20.1 19.6 - 45.3 %    Monocyte % 7.6 5.0 - 12.0 %    Eosinophil % 0.3 0.3 - 6.2 %    Basophil % 0.3 0.0 - 1.5 %    Immature Grans % 0.6 (H) 0.0 - 0.5 %    Neutrophils, Absolute 7.66 (H) 1.70 - 7.00 10*3/mm3    Lymphocytes, Absolute 2.16 0.70 - 3.10 10*3/mm3    Monocytes, Absolute 0.82 0.10 - 0.90 10*3/mm3    Eosinophils, Absolute 0.03 0.00 - 0.40 10*3/mm3    Basophils, Absolute 0.03 0.00 - 0.20 10*3/mm3    Immature Grans, Absolute 0.07 (H) 0.00 - 0.05 10*3/mm3    nRBC 0.0 0.0 - 0.2 /100 WBC   Urine Drug Screen - Urine, Clean Catch    Specimen: Urine, Clean Catch   Result Value Ref Range    THC, Screen,  Urine Positive (A) Negative    Phencyclidine (PCP), Urine Negative Negative    Cocaine Screen, Urine Negative Negative    Methamphetamine, Ur Negative Negative    Opiate Screen Positive (A) Negative    Amphetamine Screen, Urine Negative Negative    Benzodiazepine Screen, Urine Positive (A) Negative    Tricyclic Antidepressants Screen Negative Negative    Methadone Screen, Urine Negative Negative    Barbiturates Screen, Urine Negative Negative    Oxycodone Screen, Urine Negative Negative    Propoxyphene Screen Negative Negative    Buprenorphine, Screen, Urine Positive (A) Negative   Fentanyl, Urine - Urine, Clean Catch    Specimen: Urine, Clean Catch   Result Value Ref Range    Fentanyl, Urine Negative Negative   Green Top (Gel)   Result Value Ref Range    Extra Tube Hold for add-ons.    Lavender Top   Result Value Ref Range    Extra Tube hold for add-on    Gold Top - SST   Result Value Ref Range    Extra Tube Hold for add-ons.    Light Blue Top   Result Value Ref Range    Extra Tube Hold for add-ons.       CT Lower Extremity Left With Contrast   Final Result   1. Open injury to the dorsum of the left foot. Air in the soft tissues   as well as edema but no drainable fluid collection   2. No osteomyelitis        This report was finalized on 9/15/2023 2:28 PM by Dr. Farhan Morrison MD.               ED Course  ED Course as of 09/15/23 1516   Fri Sep 15, 2023   1512 Case d/w Dr. Johnson, will admit to hospitalist service. Also, discussed with Dr. Berry with General Surgery who will consult on patient. [MB]      ED Course User Index  [MB] Elena Noriega APRN                                           Medical Decision Making  Problems Addressed:  Cellulitis of right foot: complicated acute illness or injury  Open wound of right lower extremity, initial encounter: complicated acute illness or injury    Amount and/or Complexity of Data Reviewed  Labs: ordered.  Radiology: ordered.    Risk  OTC drugs.  Prescription drug  management.  Decision regarding hospitalization.        Final diagnoses:   Cellulitis of right foot   Open wound of right lower extremity, initial encounter       ED Disposition  ED Disposition       ED Disposition   Decision to Admit    Condition   --    Comment   Level of Care: Med/Surg [1]   Diagnosis: Open wound of right lower extremity [[id number]]   Admitting Physician: MAMADOU BLOOM [[id number]]   Certification: I Certify That Inpatient Hospital Services Are Medically Necessary For Greater Than 2 Midnights                 No follow-up provider specified.       Medication List      No changes were made to your prescriptions during this visit.            Elena Noriega APRN  09/15/23 1514       Elena Noriega APRN  09/15/23 1516       Elena Noriega APRHAMLET  09/15/23 1522      Electronically signed by Fernando Catalan MD at 09/15/23 1602       Current Facility-Administered Medications   Medication Dose Route Frequency Provider Last Rate Last Admin    sennosides-docusate (PERICOLACE) 8.6-50 MG per tablet 2 tablet  2 tablet Oral BID Mamadou Bloom DO        And    polyethylene glycol (MIRALAX) packet 17 g  17 g Oral Daily PRN Mamadou Bloom DO        And    bisacodyl (DULCOLAX) EC tablet 5 mg  5 mg Oral Daily PRN Mamadou Bloom DO        And    bisacodyl (DULCOLAX) suppository 10 mg  10 mg Rectal Daily PRN Mamadou Bloom DO        buprenorphine-naloxone (SUBOXONE) 8-2 MG per SL tablet 1.75 tablet  1.75 tablet Sublingual Daily Mamadou Bloom DO   1.75 tablet at 09/15/23 2000    calcium carbonate (TUMS) chewable tablet 500 mg (200 mg elemental)  2 tablet Oral BID PRN Mamadou Bloom DO        Calcium Replacement - Follow Nurse / BPA Driven Protocol   Does not apply PRN Mamadou Bloom DO        Enoxaparin Sodium (LOVENOX) syringe 40 mg  40 mg Subcutaneous Daily Mamadou Bloom DO        HYDROmorphone (DILAUDID) injection 1 mg  1 mg  Intravenous Once Awa Berry MD        Magnesium Standard Dose Replacement - Follow Nurse / BPA Driven Protocol   Does not apply PRN AlexBeto marcuser A, DO        mupirocin (BACTROBAN) 2 % ointment 1 application   1 application  Topical BID AlexMamadou marcus A, DO   1 application  at 09/15/23 2000    nicotine (NICODERM CQ) 21 MG/24HR patch 1 patch  1 patch Transdermal Once Fernando Catalan MD   1 patch at 09/15/23 1429    Phosphorus Replacement - Follow Nurse / BPA Driven Protocol   Does not apply PRN AlexBeto marcuser A, DO        piperacillin-tazobactam (ZOSYN) IVPB 3.375 g in 100 mL NS VTB  3.375 g Intravenous Q8H AlexMamadou marcus, DO   3.375 g at 09/16/23 0539    Potassium Replacement - Follow Nurse / BPA Driven Protocol   Does not apply PRN AlexBeto marcuser A, DO        sodium chloride 0.9 % flush 10 mL  10 mL Intravenous PRN Elena Noriega APRN        sodium chloride 0.9 % flush 10 mL  10 mL Intravenous Q12H AlexMamadou marcus A, DO   10 mL at 09/15/23 2000    sodium chloride 0.9 % flush 10 mL  10 mL Intravenous PRN AlexMamadou marcus A, DO        sodium chloride 0.9 % infusion 40 mL  40 mL Intravenous PRN AlexMamadou marcus A, DO        vancomycin 1250 mg/250 mL 0.9% NS IVPB ([facility])  1,250 mg Intravenous Q12H Mamadou Johnson A, DO   1,250 mg at 09/15/23 2111     Orders (last 24 hrs)        Start     Ordered    09/16/23 0845  HYDROmorphone (DILAUDID) injection 1 mg  Once         09/16/23 0750    09/16/23 0600  CBC & Differential  Morning Draw         09/15/23 1659    09/16/23 0600  Comprehensive Metabolic Panel  Morning Draw         09/15/23 1659    09/16/23 0600  CBC Auto Differential  PROCEDURE ONCE         09/15/23 2204    09/16/23 0450  Potassium  Timed,   Status:  Canceled         09/15/23 1951 09/16/23 0002  HYDROcodone-acetaminophen (NORCO) 5-325 MG per tablet 1 tablet  Once As Needed         09/16/23 0003    09/16/23 0001  NPO Diet NPO Type: Sips with  Meds  Diet Effective Midnight,   Status:  Canceled         09/15/23 1659    09/16/23 0001  NPO Diet NPO Type: Strict NPO  Diet Effective Midnight         09/15/23 1749    09/15/23 2230  piperacillin-tazobactam (ZOSYN) IVPB 3.375 g in 100 mL NS VTB  Every 8 Hours         09/15/23 1659    09/15/23 2200  vancomycin 1250 mg/250 mL 0.9% NS IVPB ([facility])  Every 12 Hours         09/15/23 1719    09/15/23 2100  sodium chloride 0.9 % flush 10 mL  Every 12 Hours Scheduled         09/15/23 1659    09/15/23 2100  sennosides-docusate (PERICOLACE) 8.6-50 MG per tablet 2 tablet  2 Times Daily        See Hyperspace for full Linked Orders Report.    09/15/23 1659    09/15/23 2100  mupirocin (BACTROBAN) 2 % ointment 1 application   2 Times Daily         09/15/23 1830    09/15/23 2045  potassium chloride (K-DUR,KLOR-CON) CR tablet 40 mEq  Every 4 Hours         09/15/23 1951    09/15/23 2000  Vital Signs  Every 4 Hours      Comments: Per per hospital policy    09/15/23 1659    09/15/23 2000  Neurovascular Checks  Every 4 Hours       09/15/23 1659    09/15/23 1930  buprenorphine-naloxone (SUBOXONE) 8-2 MG per SL tablet 1.75 tablet  Daily         09/15/23 1830    09/15/23 1800  Oral Care  2 Times Daily       09/15/23 1659    09/15/23 1745  Enoxaparin Sodium (LOVENOX) syringe 40 mg  Daily         09/15/23 1659    09/15/23 1745  piperacillin-tazobactam (ZOSYN) IVPB 3.375 g in 100 mL NS VTB  Once         09/15/23 1659    09/15/23 1704  Initiate & Follow Hypercapnic Monitoring Guideline for Opioid Administration via EtCO2 and / or SpO2  Continuous        Comments: Follow Hypercapnic Monitoring Guideline As Outlined in Process Instructions (Open Order Report to View Full Instructions)    09/15/23 1703    09/15/23 1704  Opioid Administration - Document EtCO2 and / or SpO2 With Each Set of Vitals & Any Change in Patient Status  Per Order Details        Comments: With Each Set of Vitals & Any Change in Patient Status    09/15/23 1705    09/15/23  1704  Opioid Administration - Notify Provider Hypercapnic Monitoring  Until Discontinued         09/15/23 1703    09/15/23 1704  Opioid Administration - Continuous Pulse Oximetry (SpO2)  Continuous         09/15/23 1703    09/15/23 1700  Notify Physician (with Parameters)  Until Discontinued         09/15/23 1659    09/15/23 1700  Activity - Ad Anjelica  Until Discontinued         09/15/23 1659    09/15/23 1700  Intake & Output  Every Shift       09/15/23 1659    09/15/23 1700  Weigh patient  Once         09/15/23 1659    09/15/23 1700  Insert Peripheral IV  Once         09/15/23 1659    09/15/23 1700  Saline Lock & Maintain IV Access  Continuous         09/15/23 1659    09/15/23 1700  Diet: Regular/House Diet; Texture: Regular Texture (IDDSI 7); Fluid Consistency: Thin (IDDSI 0)  Diet Effective Now,   Status:  Canceled         09/15/23 1659    09/15/23 1700  Inpatient General Surgery Consult  Once        Specialty:  General Surgery  Provider:  (Not yet assigned)    09/15/23 1659    09/15/23 1700  Inpatient Infectious Diseases Consult  Once        Specialty:  Infectious Diseases  Provider:  (Not yet assigned)    09/15/23 1659    09/15/23 1700  Wound Culture - Wound, Foot, Right  Once         09/15/23 1659    09/15/23 1659  sodium chloride 0.9 % flush 10 mL  As Needed         09/15/23 1659    09/15/23 1659  sodium chloride 0.9 % infusion 40 mL  As Needed         09/15/23 1659    09/15/23 1659  calcium carbonate (TUMS) chewable tablet 500 mg (200 mg elemental)  2 Times Daily PRN         09/15/23 1659    09/15/23 1659  polyethylene glycol (MIRALAX) packet 17 g  Daily PRN        See Hyperspace for full Linked Orders Report.    09/15/23 1659    09/15/23 1659  bisacodyl (DULCOLAX) EC tablet 5 mg  Daily PRN        See Hyperspace for full Linked Orders Report.    09/15/23 1659    09/15/23 1659  bisacodyl (DULCOLAX) suppository 10 mg  Daily PRN        See Hyperspace for full Linked Orders Report.    09/15/23 1659    09/15/23 1659   Pharmacy to dose vancomycin  Continuous PRN,   Status:  Discontinued         09/15/23 1659    09/15/23 1659  Potassium Replacement - Follow Nurse / BPA Driven Protocol  As Needed         09/15/23 1659    09/15/23 1659  Magnesium Standard Dose Replacement - Follow Nurse / BPA Driven Protocol  As Needed         09/15/23 1659    09/15/23 1659  Phosphorus Replacement - Follow Nurse / BPA Driven Protocol  As Needed         09/15/23 1659    09/15/23 1659  Calcium Replacement - Follow Nurse / BPA Driven Protocol  As Needed         09/15/23 1659    09/15/23 1659  HYDROmorphone (DILAUDID) injection 2 mg  Once         09/15/23 1643    09/15/23 1635  sodium chloride 0.9 % bolus 1,000 mL  Once         09/15/23 1620    09/15/23 1624  HYDROmorphone (DILAUDID) injection 2 mg  Once         09/15/23 1608    09/15/23 1605  ketamine hcl syringe solution prefilled syringe 45.8 mg  Once         09/15/23 1549    09/15/23 1600  US Arterial Doppler Lower Extremity Right  1 Time Imaging         09/15/23 1600    09/15/23 1533  Code Status and Medical Interventions:  Continuous         09/15/23 1535    09/15/23 1514  Inpatient General Surgery Consult  Once        Specialty:  General Surgery  Provider:  (Not yet assigned)    09/15/23 1513    09/15/23 1514  Inpatient Admission  Once         09/15/23 1513    09/15/23 1442  iopamidol (ISOVUE-300) 61 % injection 100 mL  Once in Imaging         09/15/23 1426    09/15/23 1408  piperacillin-tazobactam (ZOSYN) IVPB 3.375 g in 100 mL NS VTB  Once         09/15/23 1352    09/15/23 1359  nicotine (NICODERM CQ) 21 MG/24HR patch 1 patch  Once         09/15/23 1343    09/15/23 1342  potassium chloride (K-DUR,KLOR-CON) CR tablet 40 mEq  Once         09/15/23 1326    09/15/23 1340  HYDROcodone-acetaminophen (NORCO) 5-325 MG per tablet 1 tablet  Once         09/15/23 1324    09/15/23 1318  Fentanyl, Urine - Urine, Clean Catch  Once         09/15/23 1317    09/15/23 1305  vancomycin (VANCOCIN) 1,000 mg in  sodium chloride 0.9 % 250 mL IVPB-VTB  Once         09/15/23 1249    09/15/23 1250  CT Lower Extremity Left With Contrast  1 Time Imaging         09/15/23 1249    09/15/23 1249  Urine Drug Screen - Urine, Clean Catch  Once         09/15/23 1248    09/15/23 1241  C-reactive Protein  Once         09/15/23 1240    09/15/23 1241  Blood Culture - Blood, Arm, Right  Once         09/15/23 1240    09/15/23 1241  Blood Culture - Blood, Arm, Left  Once         09/15/23 1240    09/15/23 1241  Lactic Acid, Plasma  Once         09/15/23 1240    09/15/23 1241  Insert Peripheral IV  Once        See Hyperspace for full Linked Orders Report.    09/15/23 1240    09/15/23 1240  sodium chloride 0.9 % flush 10 mL  As Needed        See Hyperspace for full Linked Orders Report.    09/15/23 1240    09/15/23 1240  CBC & Differential  Once         09/15/23 1240    09/15/23 1240  Comprehensive Metabolic Panel  Once         09/15/23 1240    09/15/23 1240  Lipase  Once         09/15/23 1240    09/15/23 1240  Urinalysis With Microscopic If Indicated (No Culture) - Urine, Clean Catch  Once         09/15/23 1240    09/15/23 1240  [place] Draw  Once         09/15/23 1240    09/15/23 1240  CBC Auto Differential  PROCEDURE ONCE         09/15/23 1240    09/15/23 1240  Green Top (Gel)  PROCEDURE ONCE         09/15/23 1240    09/15/23 1240  Lavender Top  PROCEDURE ONCE         09/15/23 1240    09/15/23 1240  Gold Top - SST  PROCEDURE ONCE         09/15/23 1240    09/15/23 1240  Light Blue Top  PROCEDURE ONCE         09/15/23 1240    Pending  Case request  Once         Pending    --  amoxicillin (AMOXIL) 875 MG tablet  2 Times Daily         09/15/23 1715    --  buprenorphine-naloxone (SUBOXONE) 8-2 MG per SL tablet  Daily         09/15/23 1717    --  mupirocin (BACTROBAN) 2 % ointment  2 Times Daily         09/15/23 1719    --  doxycycline (VIBRAMYCIN) 100 MG capsule  2 Times Daily         09/15/23 4792    --  HYDROcodone-acetaminophen (NORCO) 7.5-325 MG  per tablet  Every 8 Hours PRN         09/15/23 1719                  Physician Progress Notes (last 24 hours)  Notes from 09/15/23 0756 through 09/16/23 0756   No notes of this type exist for this encounter.          Consult Notes (last 24 hours)        Awa Berry MD at 09/15/23 1750        Consult Orders    1. Inpatient General Surgery Consult [[id number]] ordered by Elena Noriega APRN at 09/15/23 1517                 Patient Name:  Mitchell Jay  YOB: 1990  [id number]       Patient Care Team:  Kadi Archer APRN as PCP - General (Family Medicine)      General Surgery Consult Note     Date of Consultation: 09/15/23    Consulting Physician : Mamadou Johnson, *    Reason for Consult : right foot abscess    Subjective     I have been asked to see  Mitchell Jay , a 33 y.o. female in consultation for a right foot abscess. She initially developed pain and couldn't bear weight on her foot. She then developed an abscess that was drained on Wednesday by her primary care doctor and it was packed. She was told to leave the packing and it was to be changed today. When she went to her primary care there was worsening of the wound and she was referred to the emergency department.  On arrival, she is hemodynamically stable.  Her laboratory values did not show leukocytosis.  She had a large open wound on her right foot and general surgery was consulted for evaluation.      Allergy:   Allergies   Allergen Reactions    Sulfamethoxazole Anaphylaxis    Trimethoprim Anaphylaxis       Medications:  enoxaparin, 40 mg, Subcutaneous, Daily  nicotine, 1 patch, Transdermal, Once  piperacillin-tazobactam, 3.375 g, Intravenous, Once  piperacillin-tazobactam, 3.375 g, Intravenous, Q8H  senna-docusate sodium, 2 tablet, Oral, BID  sodium chloride, 10 mL, Intravenous, Q12H  vancomycin, 1,250 mg, Intravenous, Q12H         No current facility-administered medications on file prior to encounter.     Current  Outpatient Medications on File Prior to Encounter   Medication Sig    amoxicillin (AMOXIL) 875 MG tablet Take 1 tablet by mouth 2 (Two) Times a Day.    buprenorphine-naloxone (SUBOXONE) 8-2 MG per SL tablet Place 1.75 tablets under the tongue Daily.    doxycycline (VIBRAMYCIN) 100 MG capsule Take 1 capsule by mouth 2 (Two) Times a Day.    HYDROcodone-acetaminophen (NORCO) 7.5-325 MG per tablet Take 1 tablet by mouth Every 8 (Eight) Hours As Needed for Moderate Pain or Severe Pain.    mupirocin (BACTROBAN) 2 % ointment Apply 1 application  topically to the appropriate area as directed 2 (Two) Times a Day.    [DISCONTINUED] amoxicillin (AMOXIL) 500 MG capsule     [DISCONTINUED] buprenorphine (SUBUTEX) 8 MG sublingual tablet SL tablet Place 1 tablet under the tongue Every 12 (Twelve) Hours.    [DISCONTINUED] buprenorphine-naloxone (SUBOXONE) 8-2 MG per SL tablet     [DISCONTINUED] Prenatal Vit-Fe Fumarate-FA (PRENATAL VITAMIN 27-0.8) 27-0.8 MG tablet tablet Take 1 tablet by mouth Daily.       PMHx:   Past Medical History:   Diagnosis Date    Congestive heart failure     Hepatitis C     Hypertension        Past Surgical History:  Surgery    Family History: Noncontributory     Social History:  Reports history of illicit drug use, reports no recent drug use.     Review of Systems   Pertinent items are noted in HPI     Constitutional: No fevers, chills or malaise   Eyes: Denies visual changes    Cardiovascular: Denies chest pain, palpitations   Pulmonary: Denies cough or shortness of breath   Abdominal/ GI: Nontender    Genitourinary: Denies dysuria or hematuria   Musculoskeletal: Denies any but chronic joint aches, pains or deformities   Psychiatric: No recent mood changes   Neurologic: No paresthesias or loss of function         Objective     Physical Exam:      Vital Signs  /76 (BP Location: Right arm, Patient Position: Lying)   Pulse 58 Comment: RN aware  Temp 98.8 °F (37.1 °C) (Axillary)   Resp 18   Ht 175.3  "cm (69\")   Wt 53.8 kg (118 lb 9.7 oz)   LMP 09/15/2023 (Exact Date)   SpO2 100%   BMI 17.52 kg/m²     Intake/Output Summary (Last 24 hours) at 9/15/2023 1751  Last data filed at 9/15/2023 1722  Gross per 24 hour   Intake 590 ml   Output --   Net 590 ml         Physical Exam:    Head: Normocephalic, atraumatic.   Eyes: Pupils equal, round, react to light   Mouth: No lesions noted  CV: Regular rate and rhythm   Lungs: Bilateral chest rise and fall, no use of accessory muscles   Abdomen: Soft, nontender, nondistended  Extremities:  No cyanosis, clubbing or edema bilaterally   Neurologic: No gross deficits      Results Review: I have personally reviewed all of the recent lab and imaging results available at this time: CT scan reviewed.    IN-OFFICE PROCEDURE NOTE    Patient Name:  Mitchell Jay  YOB: 1990  [id number]    9/15/2023      PREOPERATIVE DIAGNOSIS: right foot wound       POSTOPERATIVE DIAGNOSIS: same       PROCEDURE PERFORMED: Debridement of right foot wound and drainage of deep abscess of right foot       SURGEON: Awa Berry MD        SPECIMENS: None       ANESTHESIA: None       FINDINGS:   1.  Necrotic tissue around wound edges, purulence tracking up anterior aspect of ankle drained.       INDICATIONS:    The patient is a 33 y.o. female presented to the emergency department with a worsening foot wound.  Risks and benefits of proceeding with debridement were discussed and she elected to proceed.     DESCRIPTION OF PROCEDURE:     Ketamine was administered as the anesthetic.  Once the patient was adequately anesthetized, I removed the previously packing.  This was actually found in multiple segments.  Once this was removed, I began with debridement of the necrotic skin surrounding the wound.  Once this was removed, I probed the wound finding a tract extending up the anterior aspect of the ankle.  Once I probed this with my finger, a large rush of pus escaped from this area.  I then " copiously washed the area with Betadine and saline.  I packed the wound and applied a dressing of 4 x 4's and a Kerlix, and with an Ace wrap.  The total wound dimensions measured 6cm wide x 8cm long x 2 cm deep.        Awa Berry MD  9/15/2023  17:57 EDT        Assessment and Plan:    Problem List Items Addressed This Visit          Musculoskeletal and Injuries    * (Principal) Open wound of right lower extremity     Other Visit Diagnoses       Cellulitis of right foot    -  Primary             Active Hospital Problems    Diagnosis  POA    **Open wound of right lower extremity [S81.801A]  Yes      Resolved Hospital Problems   No resolved problems to display.      Ms. Jay is a 33-year-old woman who presents to the emergency department with a right foot abscess.  I performed a debridement and drainage of right deep foot abscess in the emergency department under conscious sedation.  I am concerned about the overall appearance of the wound, although I feel like I did as much as I could safely do at this initial debridement.  We will plan for her to be n.p.o. at midnight and likely will return to the operating room tomorrow for a reevaluation of the wound and further cleanout.  I have marked the extent of the cellulitis I have noted on exam after drainage.  She has scheduled IV antibiotics that she will receive tonight.    I discussed the patient's findings and my recommendations with the patient and/or family, as well as the primary team     Awa Berry MD  09/15/23  17:51 EDT          Electronically signed by Awa Berry MD at 09/15/23 1800

## 2023-09-16 NOTE — CONSULTS
"Adult Nutrition  Assessment    Patient Name:  Mitchell Jay  YOB: 1990  MRN: [id number]  Admit Date:  9/15/2023    Assessment Date:  9/16/2023    Comments:  Patient NPO at time of review for procedure.  Once diet advance will start protein supplement, and recommend starting a MVI related to wound healing.  Will monitor intakes, pertinent labs and follow up with any edu needs.     Reason for Assessment       Row Name 09/16/23 1118          Reason for Assessment    Reason For Assessment identified at risk by screening criteria     Diagnosis infection/sepsis     Identified At Risk by Screening Criteria large or nonhealing wound, burn or pressure injury                      Labs/Tests/Procedures/Meds       Row Name 09/16/23 1120          Labs/Procedures/Meds    Lab Results Reviewed reviewed     Lab Results Comments CRP-17.45        Diagnostic Tests/Procedures    Diagnostic Test/Procedure Reviewed reviewed        Medications    Pertinent Medications Reviewed reviewed                    Physical Findings       Row Name 09/16/23 1120          Physical Findings    Overall Physical Appearance Patient presnts with underweight BMI, and is NPO today for procedure to foot.  Has large abcess with surrounding necrotic tissue that was debrided at bedside and since had lots of purulent discharge and so today NPO for procedure to drain.  PMH: CHF, hypertension.  Reports past IVDA but states not for a while.  positive for Benzo for pain. Patient ate 75% of one meal.  will continue to follow and once diet advanced will start protein supplement and monitor intakes.  Will conduct MSA on her.  no recent weight loss.  But when it is appropriate will do NFPE.                    Estimated/Assessed Needs - Anthropometrics       Row Name 09/16/23 1123          Anthropometrics    Age for Calculations 33     Height for Calculation 1.753 m (5' 9\")     Weight for Calculation 53.8 kg (118 lb 9.7 oz)     Additional Documentation Ideal " Body Weight (IBW) (Group)        Ideal Body Weight (IBW)    Ideal Body Weight 66.2kg        Estimated/Assessed Needs    Additional Documentation Fluid Requirements (Group);KCAL/KG (Group);Protein Requirements (Group)        KCAL/KG    KCAL/KG 30 Kcal/Kg (kcal);35 Kcal/Kg (kcal)     30 Kcal/Kg (kcal) 1614     35 Kcal/Kg (kcal) 1883        Protein Requirements    Weight Used For Protein Calculations 53.8 kg (118 lb 9.7 oz)     Est Protein Requirement Amount (gms/kg) 1.5 gm protein     Estimated Protein Requirements (gms/day) 80.7        Fluid Requirements    Fluid Requirements (mL/day) 1883     RDA Method (mL) 1883                    Nutrition Prescription Ordered       Row Name 09/16/23 1133          Nutrition Prescription PO    Current PO Diet NPO                    Evaluation of Received Nutrient/Fluid Intake       Row Name 09/16/23 1133          Fluid Intake Evaluation    Total Fluid Target (mL) 1883     Oral Fluid (mL) 1840     Enteral Fluid (mL) 0     Parenteral Fluid (mL) 0     Other Fluid (mL) 350     Total Fluid Intake (mL) 2190     % Total Fluid Intake 116.3        PO Evaluation    Number of Meals 1     % PO Intake 75                       Electronically signed by:  Allison Chandler RD  09/16/23 11:36 EDT

## 2023-09-16 NOTE — OP NOTE
OPERATIVE NOTE    Patient Name:  Mitchell Jay  YOB: 1990  [id number]    Date of Surgery:  9/16/2023    PREOPERATIVE DIAGNOSIS: right foot wound and deep foot abscess     POSTOPERATIVE DIAGNOSIS: Same      PROCEDURE PERFORMED: Incision and drainage of right foot wound    SURGEON: Awa Berry MD     Circulator: Rossy Falcon RN  Scrub Person: Mini Saraviaara King  Assistant: Mercedes Vaz     Assistant: Mercedes Vaz    SPECIMENS: None     EBL: 15     ANESTHESIA: General.      FINDINGS:   1. Large amount of purulent material drained from superior aspect of foot wound     INDICATIONS: The patient is a 33 y.o. female with a large foot wound after an abscess was drained earlier this week. Bedside debridement performed in the ER yesterday with persistent drainage of pus from the wound today. Risks and benefits of proceeding to the operating room were discussed with the patient and she elected to proceed.     DESCRIPTION OF PROCEDURE:   She was taken to the operating room and placed in the supine position on the operating table.  General endotracheal anesthesia was initiated and a timeout was performed.  The patient was prepped and draped in sterile fashion.  I began with exploring the wound bluntly.  There is evidence of tracking of the wound superiorly with purulent drainage.  I opened this area approximately 5 cm on the superior portion of the wound.  Purulent drainage was expressed from this portion of the wound.  The wound did track approximately 3 cm more superior, but I felt that I was able to adequately drain this with blunt dissection.  I then irrigated 1 L of saline into the wound.  This was washed clean and there was no more purulence able to be expressed from the wound.  I debrided a small amount of necrotic skin surrounding the wound, this was no more than 1 cm in any direction of the wound.  Then used electrocautery to achieve hemostasis along with direct pressure.  The total  dimensions of the wound were 6 cm wide by 8 cm long.  The wound was approximately 2 cm deep.  Once hemostatic, I packed Surgicel into the superior portion of the wound and then packed dampened Kerlix on top.  I covered the wound in dry gauze and wrapped with dry Kerlix.  I then wrapped the wound with a Ace wrap.  This concluded the operation.  At the end of the case the instrument count was correct.  The patient was awoken from anesthesia and transported PACU for further monitoring.      COMPLICATIONS: None     Awa Berry MD  9/16/2023  10:43 EDT

## 2023-09-16 NOTE — PLAN OF CARE
Goal Outcome Evaluation:  Plan of Care Reviewed With: patient        Progress: no change  Outcome Evaluation: Patient resting in bed at this time with spouse at bedside. VSS. Patient c/o pain, Amee CASTELLANOS aware, see orders. Patient has been NPO since midnight. Surgery bath completed. Will continue plan of care.

## 2023-09-16 NOTE — PROGRESS NOTES
"Patient Name:  Mitchell Jay  YOB: 1990  [id number]    Surgery Progress Note    Date of visit: 9/16/2023    Subjective   Subjective: Ms Jay reports feeling well this morning. Hemodynamically stable. Afebrile. Dressing in place from drainage yesterday.          Objective     Objective:     /69 (BP Location: Right arm, Patient Position: Lying)   Pulse 75   Temp 98.1 °F (36.7 °C) (Oral)   Resp 16   Ht 175.3 cm (69\")   Wt 53.8 kg (118 lb 9.7 oz)   LMP 09/15/2023 (Exact Date)   SpO2 96%   BMI 17.52 kg/m²     Intake/Output Summary (Last 24 hours) at 9/16/2023 0918  Last data filed at 9/15/2023 2308  Gross per 24 hour   Intake 2190 ml   Output --   Net 2190 ml       CV:  Regular rate and rhythm  L:  Bilateral lung rise and fall, no use of accessory muscles   Abd:  Soft, nontender, nondistended  Ext:  No cyanosis, clubbing, edema  Right anterior foot: drainage of purulent material from inferior aspect of wound.     Recent labs that are back at this time have been reviewed.           Assessment/ Plan:    Problem List Items Addressed This Visit          Musculoskeletal and Injuries    * (Principal) Open wound of right lower extremity    Relevant Orders    Case request (Completed)     Other Visit Diagnoses       Cellulitis of right foot    -  Primary             Active Hospital Problems    Diagnosis  POA    **Open wound of right lower extremity [S81.801A]  Yes      Resolved Hospital Problems   No resolved problems to display.      Ms Jay is a 32 yo F with a right foot wound. Continues to have large volume purulent drainage from the wound bed. Will plan for operative incision and drainage today.       Awa Berry MD  9/16/2023  09:18 EDT      "

## 2023-09-16 NOTE — CONSULTS
INFECTIOUS DISEASE CONSULTATION REPORT        Patient Identification:  Name:  Mitchell Jay  Age:  33 y.o.  Sex:  female  :  1990  MRN:  [id number]   Visit Number:  [id number]  Primary Care Physician:  Kadi Archer APRN        Referring Provider: Dr. Johnson    Reason for consult: Evaluation of right foot wound       LOS: 1 day        Subjective       Subjective     History of present illness:      Thank you Dr. Johnson for allowing us to participate in the care of your patient.  As you well know, Ms. Mitchell Jay is a 33 y.o. female with past medical history significant for polysubstance abuse, aortic valve replacement, CHF, hypertension, who presented to [facility] on 9/15/2023 for evaluation of right foot wound.  Patient reported right foot wound started approximately 1 week ago with initial redness and swelling followed by a raised black nodule and significant pain.  Patient was seen by her PCP who ultimately lanced and packed the wound 2 days prior to admission to the hospital.  Video of PCP lancing the wound was visualized and white purulent material was expressed from the wound however unfortunately culture was not performed.  WBC 9.64.  CRP 17.45.  Lactic acid 1.2.  Blood cultures from 9/15/2023 currently in process.  Wound culture of the right foot currently in process.  CT of the lower extremity reports open injury to the dorsum of the foot air in the soft tissues as well as edema but no drainable fluid collection, no osteomyelitis.  Serial Doppler from 9/15/2023 reports moderate to severe peripheral artery vascular disease with monophasic waveforms identified throughout the right lower extremity, elevated peak systolic velocities identified throughout the right lower extremity likely due to underlying multiple stenosis, no occluded arterial segment.    Today patient sitting up in bed with plans to go to the OR today for further incision and  drainage of abscess within the right foot.  Lungs clear to auscultation bilaterally.  Abdomen soft, nontender.  Afebrile, denies diarrhea.  Patient denies any recent IV drug abuse and denies IV injection into right foot reports she feels the wound started from a possible spider or snake bite.  Significant other at other at bedside.      Infectious Disease consultation was requested for antimicrobial management.      ---------------------------------------------------------------------------------------------------------------------     Review Of Systems:    Constitutional: no fever, chills and night sweats. No appetite change or unexpected weight change. No fatigue.  Eyes: no eye drainage, itching or redness.  HEENT: no mouth sores, dysphagia or nose bleed.  Respiratory: no for shortness of breath, cough or production of sputum.  Cardiovascular: no chest pain, no palpitations, no orthopnea.  Gastrointestinal: no nausea, vomiting or diarrhea. No abdominal pain, hematemesis or rectal bleeding.  Genitourinary: no dysuria or polyuria.  Hematologic/lymphatic: no lymph node abnormalities, no easy bruising or easy bleeding.  Musculoskeletal: Right foot pain.  Right foot with open wound  Skin: No rash and no itching.  Neurological: no loss of consciousness, no seizure, no headache.  Behavioral/Psych: no depression or suicidal ideation.  Endocrine: no hot flashes.  Immunologic: negative.    ---------------------------------------------------------------------------------------------------------------------     Past Medical History    Past Medical History:   Diagnosis Date    Congestive heart failure     Hepatitis C     Hypertension        Past Surgical History    Past Surgical History:   Procedure Laterality Date     SECTION WITH TUBAL Bilateral 2017    Procedure:  SECTION REPEAT WITH TUBAL;  Surgeon: Jonas Wray MD;  Location:  [facility];  Service:        Family History    History  reviewed. No pertinent family history.    Social History    Social History     Tobacco Use    Smoking status: Every Day     Packs/day: 1.00     Types: Cigarettes    Smokeless tobacco: Never   Vaping Use    Vaping Use: Unknown   Substance Use Topics    Alcohol use: No    Drug use: Yes     Types: Benzodiazepines, Marijuana, Methamphetamines, Oxycodone       Allergies    Sulfamethoxazole and Trimethoprim  ---------------------------------------------------------------------------------------------------------------------     Home Medications:    Prior to Admission Medications       Prescriptions Last Dose Informant Patient Reported? Taking?    amoxicillin (AMOXIL) 875 MG tablet 9/15/2023 Self, Pharmacy Yes Yes    Take 1 tablet by mouth 2 (Two) Times a Day.    buprenorphine-naloxone (SUBOXONE) 8-2 MG per SL tablet 9/15/2023 Self, Pharmacy Yes Yes    Place 1.75 tablets under the tongue Daily.    doxycycline (VIBRAMYCIN) 100 MG capsule 9/15/2023 Self, Pharmacy Yes Yes    Take 1 capsule by mouth 2 (Two) Times a Day.    HYDROcodone-acetaminophen (NORCO) 7.5-325 MG per tablet 9/15/2023 Self, Pharmacy Yes Yes    Take 1 tablet by mouth Every 8 (Eight) Hours As Needed for Moderate Pain or Severe Pain.    mupirocin (BACTROBAN) 2 % ointment Unknown Pharmacy, Self Yes No    Apply 1 application  topically to the appropriate area as directed 2 (Two) Times a Day.          ---------------------------------------------------------------------------------------------------------------------    Objective       Objective     Hospital Scheduled Meds:  enoxaparin, 40 mg, Subcutaneous, Daily  gabapentin, 600 mg, Oral, Q8H  mupirocin, 1 application , Topical, BID  nicotine, 1 patch, Transdermal, Once  piperacillin-tazobactam, 3.375 g, Intravenous, Q8H  senna-docusate sodium, 2 tablet, Oral, BID  sodium chloride, 10 mL, Intravenous, Q12H  vancomycin, 1,250 mg, Intravenous, Q12H          ---------------------------------------------------------------------------------------------------------------------   Vital Signs:  Temp:  [97.5 °F (36.4 °C)-99.9 °F (37.7 °C)] 97.9 °F (36.6 °C)  Heart Rate:  [] 65  Resp:  [16-26] 20  BP: (103-148)/() 131/78  Mean Arterial Pressure (Non-Invasive) for the past 24 hrs (Last 3 readings):   Noninvasive MAP (mmHg)   09/15/23 1615 121   09/15/23 1605 117   09/15/23 1600 96     SpO2 Percentage    09/16/23 1102 09/16/23 1107 09/16/23 1120   SpO2: 100% 99% 99%     SpO2:  [96 %-100 %] 99 %  on   ;   Device (Oxygen Therapy): room air    Body mass index is 17.52 kg/m².  Wt Readings from Last 3 Encounters:   09/15/23 53.8 kg (118 lb 9.7 oz)   03/05/20 54.9 kg (121 lb)   11/08/17 64.4 kg (142 lb)     ---------------------------------------------------------------------------------------------------------------------     Physical Exam:    Constitutional:  Well-developed and well-nourished.  No respiratory distress.  On room air.  Significant other at bedside.  HENT:  Head: Normocephalic and atraumatic.  Mouth:  Moist mucous membranes.    Eyes:  Conjunctivae and EOM are normal.  No scleral icterus.  Neck:  Neck supple.  No JVD present.    Cardiovascular:  Normal rate, regular rhythm and normal heart sounds with no murmur.  Right foot edema.  Pulmonary/Chest:  No respiratory distress, no wheezes, no crackles, with normal breath sounds and good air movement.  Abdominal:  Soft.  Bowel sounds are normal.  No distension and no tenderness.   Musculoskeletal:  Right foot edema, Right foot tenderness, and no deformity.  No swelling or redness of joints.  Neurological:  Alert and oriented to person, place, and time.  No facial droop.  No slurred speech.   Skin:  Skin is warm and dry.  No rash noted.  No pallor.  Right foot wound open with yellow sloughing noted at the wound base, purulent drainage.  Surrounding erythema and warmth.  Psychiatric:  Normal mood and affect.   Behavior is normal.    ---------------------------------------------------------------------------------------------------------------------              Results from last 7 days   Lab Units 09/16/23  0523 09/15/23  1244   CRP mg/dL  --  17.45*   LACTATE mmol/L  --  1.2   WBC 10*3/mm3 9.64 10.77   HEMOGLOBIN g/dL 10.7* 12.5   HEMATOCRIT % 33.3* 37.4   MCV fL 92.2 89.3   MCHC g/dL 32.1 33.4   PLATELETS 10*3/mm3 315 325     Results from last 7 days   Lab Units 09/16/23  0523 09/15/23  1244   SODIUM mmol/L 138 133*   POTASSIUM mmol/L 4.4 3.3*   CHLORIDE mmol/L 107 96*   CO2 mmol/L 24.1 26.3   BUN mg/dL 4* 4*   CREATININE mg/dL 0.51* 0.57   CALCIUM mg/dL 9.0 9.3   GLUCOSE mg/dL 96 97   ALBUMIN g/dL 2.6* 3.4*   BILIRUBIN mg/dL 0.3 0.4   ALK PHOS U/L 76 86   AST (SGOT) U/L 14 12   ALT (SGPT) U/L 8 8   Estimated Creatinine Clearance: 134.5 mL/min (A) (by C-G formula based on SCr of 0.51 mg/dL (L)).  No results found for: AMMONIA    No results found for: HGBA1C, POCGLU  No results found for: HGBA1C  No results found for: TSH, FREET4    No results found for: BLOODCX  No results found for: URINECX  No results found for: WOUNDCX  No results found for: STOOLCX  No results found for: RESPCX  Pain Management Panel  More data may exist         Latest Ref Rng & Units 9/15/2023 11/8/2017   Pain Management Panel   Amphetamine, Urine Qual Negative Negative  Negative    Barbiturates Screen, Urine Negative Negative  Negative    Benzodiazepine Screen, Urine Negative Positive  Negative    Buprenorphine, Screen, Urine Negative Positive  Positive    Cocaine Screen, Urine Negative Negative  Negative    Fentanyl, Urine Negative Negative  -   Methadone Screen , Urine Negative Negative  Negative    Methamphetamine, Ur Negative Negative  -     I have personally reviewed the above laboratory results.   ---------------------------------------------------------------------------------------------------------------------  Imaging Results (Last 7  Days)       Procedure Component Value Units Date/Time    US Arterial Doppler Lower Extremity Right [[id number]] Collected: 09/15/23 1810     Updated: 09/15/23 1814    Narrative:      Procedure: Arterial duplex ultrasound evaluation of the right lower  extremity performed on 09/15/2023. Color flow imaging performed.  Grayscale imaging performed. Waveform analysis performed.     HISTORY: Necrotic wound. Nonpalpable pulses.     FINDINGS:     Monophasic waveform at the right external iliac artery.  Monophasic waveform at the right common femoral artery.  Monophasic waveforms at the right superficial femoral artery, right  popliteal artery, and right posterior tibial artery. Elevated peak  systolic velocities identified throughout the right lower extremity.  No occluded arterial segment.       Impression:         1.  Moderate to severe peripheral arterial vascular disease with  monophasic waveforms identified throughout the right lower extremity.  2.  Elevated peak systolic velocities identified throughout the right  lower extremity likely due to underlying multiple stenoses.  3.  No occluded arterial segment.     This report was finalized on 9/15/2023 6:12 PM by Benigno Pettit MD.       CT Lower Extremity Left With Contrast [[id number]] Collected: 09/15/23 1427     Updated: 09/15/23 1430    Narrative:      EXAMINATION: CT LOWER EXTREMITY LEFT W CONTRAST-      CLINICAL INDICATION: Left foot        COMPARISON: None available.     Radiation dose reduction techniques were utilized per ALARA protocol.  Automated exposure control was initiated through either or [employer] or  [employer] software packages by  protocol.           PROCEDURE:  Axial images through the left foot were acquired during IV contrast.  Reformatted images were created.     FINDINGS:  There is soft tissue injury with air in the soft tissues as well as  edema in the dorsum of the foot.     No underlying drainable fluid collection is seen     There  are no enhancing masses     No osteomyelitis.       Impression:      1. Open injury to the dorsum of the left foot. Air in the soft tissues  as well as edema but no drainable fluid collection  2. No osteomyelitis      This report was finalized on 9/15/2023 2:28 PM by Dr. Farhan Morrison MD.             I have personally reviewed the above radiology results.   ---------------------------------------------------------------------------------------------------------------------      Pertinent Infectious Disease Results          Assessment & Plan      Assessment        Right foot wound with abscess  Polysubstance abuse  History of aortic valve replacement      Plan      Patient presented to [facility] Department on 9/15/2023 for evaluation of right foot wound.  Patient reported right foot wound started approximately 1 week ago with initial redness and swelling followed by a raised black nodule and significant pain.  Patient was seen by her PCP who ultimately lanced and packed the wound 2 days prior to admission to the hospital.  Video of PCP lancing the wound was visualized and white purulent material was expressed from the wound however unfortunately culture was not performed.  WBC 9.64.  CRP 17.45.  Lactic acid 1.2.  Blood cultures from 9/15/2023 currently in process.  Wound culture of the right foot currently in process.  CT of the lower extremity reports open injury to the dorsum of the foot air in the soft tissues as well as edema but no drainable fluid collection, no osteomyelitis.  Serial Doppler from 9/15/2023 reports moderate to severe peripheral artery vascular disease with monophasic waveforms identified throughout the right lower extremity, elevated peak systolic velocities identified throughout the right lower extremity likely due to underlying multiple stenosis, no occluded arterial segment.    Today patient sitting up in bed with plans to go to the OR today for further incision and  drainage of abscess within the right foot.  Lungs clear to auscultation bilaterally.  Abdomen soft, nontender.  Afebrile, denies diarrhea.  Patient denies any recent IV drug abuse and denies IV injection into right foot reports she feels the wound started from a possible spider or snake bite.  Significant other at other at bedside.    We are concerned about the depth of infection evidence in patient's right foot.  For now we will continue vancomycin and Zosyn pending finalization of wound culture results.  Patient will likely require prolonged course of antibiotic therapy.  We will continue to follow closely and adjust antibiotic therapy as needed.          ANTIMICROBIAL THERAPY    amoxicillin - 875 MG  doxycycline - 100 MG  piperacillin-tazobactam (ZOSYN) IVPB 3.375 g in 100 mL NS VTB  vancomycin       Again, thank you Dr. Johnson for allowing us to participate in the care of your patient and please feel free to call for any questions you may have.        Code Status:     Code Status and Medical Interventions:   Ordered at: 09/15/23 1536     Code Status (Patient has no pulse and is not breathing):    CPR (Attempt to Resuscitate)     Medical Interventions (Patient has pulse or is breathing):    Full Support         Jennifer Dunham, APRN  09/16/23  11:51 EDT

## 2023-09-16 NOTE — ANESTHESIA PROCEDURE NOTES
Airway  Urgency: elective    Date/Time: 9/16/2023 10:05 AM  Airway not difficult    General Information and Staff    Patient location during procedure: OR  Anesthesiologist: Justin Jenkins DO  CRNA/CAA: Cory White CRNA    Indications and Patient Condition    Preoxygenated: yes  MILS not maintained throughout  Mask difficulty assessment: 0 - not attempted    Final Airway Details  Final airway type: supraglottic airway      Successful airway: unique  Size 4     Number of attempts at approach: 1  Assessment: lips, teeth, and gum same as pre-op and atraumatic intubation    Additional Comments  Atraumatic LMA placement, dentition unchanged.

## 2023-09-17 LAB
ANION GAP SERPL CALCULATED.3IONS-SCNC: 12.5 MMOL/L (ref 5–15)
BASOPHILS # BLD AUTO: 0.05 10*3/MM3 (ref 0–0.2)
BASOPHILS NFR BLD AUTO: 0.5 % (ref 0–1.5)
BUN SERPL-MCNC: 4 MG/DL (ref 6–20)
BUN/CREAT SERPL: 7 (ref 7–25)
CALCIUM SPEC-SCNC: 8.8 MG/DL (ref 8.6–10.5)
CHLORIDE SERPL-SCNC: 104 MMOL/L (ref 98–107)
CO2 SERPL-SCNC: 21.5 MMOL/L (ref 22–29)
CREAT SERPL-MCNC: 0.57 MG/DL (ref 0.57–1)
DEPRECATED RDW RBC AUTO: 42.3 FL (ref 37–54)
EGFRCR SERPLBLD CKD-EPI 2021: 124 ML/MIN/1.73
EOSINOPHIL # BLD AUTO: 0.13 10*3/MM3 (ref 0–0.4)
EOSINOPHIL NFR BLD AUTO: 1.3 % (ref 0.3–6.2)
ERYTHROCYTE [DISTWIDTH] IN BLOOD BY AUTOMATED COUNT: 12.3 % (ref 12.3–15.4)
GLUCOSE SERPL-MCNC: 100 MG/DL (ref 65–99)
HCT VFR BLD AUTO: 35.7 % (ref 34–46.6)
HGB BLD-MCNC: 11.2 G/DL (ref 12–15.9)
IMM GRANULOCYTES # BLD AUTO: 0.06 10*3/MM3 (ref 0–0.05)
IMM GRANULOCYTES NFR BLD AUTO: 0.6 % (ref 0–0.5)
LYMPHOCYTES # BLD AUTO: 2.71 10*3/MM3 (ref 0.7–3.1)
LYMPHOCYTES NFR BLD AUTO: 27.2 % (ref 19.6–45.3)
MCH RBC QN AUTO: 29.5 PG (ref 26.6–33)
MCHC RBC AUTO-ENTMCNC: 31.4 G/DL (ref 31.5–35.7)
MCV RBC AUTO: 93.9 FL (ref 79–97)
MONOCYTES # BLD AUTO: 0.75 10*3/MM3 (ref 0.1–0.9)
MONOCYTES NFR BLD AUTO: 7.5 % (ref 5–12)
NEUTROPHILS NFR BLD AUTO: 6.25 10*3/MM3 (ref 1.7–7)
NEUTROPHILS NFR BLD AUTO: 62.9 % (ref 42.7–76)
NRBC BLD AUTO-RTO: 0 /100 WBC (ref 0–0.2)
PLATELET # BLD AUTO: 353 10*3/MM3 (ref 140–450)
PMV BLD AUTO: 9.2 FL (ref 6–12)
POTASSIUM SERPL-SCNC: 4.1 MMOL/L (ref 3.5–5.2)
RBC # BLD AUTO: 3.8 10*6/MM3 (ref 3.77–5.28)
SODIUM SERPL-SCNC: 138 MMOL/L (ref 136–145)
VANCOMYCIN TROUGH SERPL-MCNC: 7.4 MCG/ML (ref 5–20)
WBC NRBC COR # BLD: 9.95 10*3/MM3 (ref 3.4–10.8)

## 2023-09-17 PROCEDURE — 25010000002 CEFTRIAXONE PER 250 MG: Performed by: NURSE PRACTITIONER

## 2023-09-17 PROCEDURE — 85025 COMPLETE CBC W/AUTO DIFF WBC: CPT | Performed by: SURGERY

## 2023-09-17 PROCEDURE — 80202 ASSAY OF VANCOMYCIN: CPT | Performed by: SURGERY

## 2023-09-17 PROCEDURE — 25010000002 PIPERACILLIN SOD-TAZOBACTAM PER 1 G: Performed by: SURGERY

## 2023-09-17 PROCEDURE — 99232 SBSQ HOSP IP/OBS MODERATE 35: CPT | Performed by: NURSE PRACTITIONER

## 2023-09-17 PROCEDURE — 25010000002 ENOXAPARIN PER 10 MG: Performed by: SURGERY

## 2023-09-17 PROCEDURE — 25010000002 HYDROMORPHONE PER 4 MG: Performed by: SURGERY

## 2023-09-17 PROCEDURE — 80048 BASIC METABOLIC PNL TOTAL CA: CPT | Performed by: SURGERY

## 2023-09-17 PROCEDURE — 25010000002 VANCOMYCIN 5 G RECONSTITUTED SOLUTION: Performed by: SURGERY

## 2023-09-17 PROCEDURE — 99024 POSTOP FOLLOW-UP VISIT: CPT | Performed by: SURGERY

## 2023-09-17 PROCEDURE — 25010000002 VANCOMYCIN 5 G RECONSTITUTED SOLUTION: Performed by: INTERNAL MEDICINE

## 2023-09-17 PROCEDURE — 25010000002 MORPHINE PER 10 MG: Performed by: INTERNAL MEDICINE

## 2023-09-17 RX ORDER — VANCOMYCIN/0.9 % SOD CHLORIDE 1.5G/250ML
1500 PLASTIC BAG, INJECTION (ML) INTRAVENOUS EVERY 12 HOURS
Status: DISCONTINUED | OUTPATIENT
Start: 2023-09-17 | End: 2023-09-18 | Stop reason: HOSPADM

## 2023-09-17 RX ORDER — HYDROMORPHONE HYDROCHLORIDE 2 MG/ML
2 INJECTION, SOLUTION INTRAMUSCULAR; INTRAVENOUS; SUBCUTANEOUS ONCE
Status: COMPLETED | OUTPATIENT
Start: 2023-09-17 | End: 2023-09-17

## 2023-09-17 RX ADMIN — VANCOMYCIN HYDROCHLORIDE 1250 MG: 500 INJECTION, POWDER, LYOPHILIZED, FOR SOLUTION INTRAVENOUS at 12:11

## 2023-09-17 RX ADMIN — SODIUM CHLORIDE 2000 MG: 9 INJECTION, SOLUTION INTRAVENOUS at 13:45

## 2023-09-17 RX ADMIN — MORPHINE SULFATE 2 MG: 2 INJECTION, SOLUTION INTRAMUSCULAR; INTRAVENOUS at 22:04

## 2023-09-17 RX ADMIN — HYDROMORPHONE HYDROCHLORIDE 2 MG: 2 INJECTION, SOLUTION INTRAMUSCULAR; INTRAVENOUS; SUBCUTANEOUS at 09:50

## 2023-09-17 RX ADMIN — PIPERACILLIN SODIUM AND TAZOBACTAM SODIUM 3.38 G: 3; .375 INJECTION, POWDER, LYOPHILIZED, FOR SOLUTION INTRAVENOUS at 05:42

## 2023-09-17 RX ADMIN — MORPHINE SULFATE 2 MG: 2 INJECTION, SOLUTION INTRAMUSCULAR; INTRAVENOUS at 18:07

## 2023-09-17 RX ADMIN — GABAPENTIN 600 MG: 300 CAPSULE ORAL at 05:42

## 2023-09-17 RX ADMIN — Medication 10 ML: at 20:40

## 2023-09-17 RX ADMIN — ENOXAPARIN SODIUM 40 MG: 40 INJECTION SUBCUTANEOUS at 09:13

## 2023-09-17 RX ADMIN — GABAPENTIN 600 MG: 300 CAPSULE ORAL at 22:04

## 2023-09-17 RX ADMIN — MUPIROCIN 1 APPLICATION: 20 OINTMENT TOPICAL at 09:14

## 2023-09-17 RX ADMIN — VANCOMYCIN HYDROCHLORIDE 1500 MG: 500 INJECTION, POWDER, LYOPHILIZED, FOR SOLUTION INTRAVENOUS at 15:20

## 2023-09-17 RX ADMIN — DOCUSATE SODIUM 50 MG AND SENNOSIDES 8.6 MG 2 TABLET: 8.6; 5 TABLET, FILM COATED ORAL at 20:40

## 2023-09-17 RX ADMIN — MUPIROCIN 1 APPLICATION: 20 OINTMENT TOPICAL at 20:40

## 2023-09-17 RX ADMIN — GABAPENTIN 600 MG: 300 CAPSULE ORAL at 14:20

## 2023-09-17 NOTE — PROGRESS NOTES
PROGRESS NOTE         Patient Identification:  Name:  Mitchell Jay  Age:  33 y.o.  Sex:  female  :  1990  MRN:  [id number]  Visit Number:  [id number]  Primary Care Provider:  Kadi Archer APRN         LOS: 2 days       ----------------------------------------------------------------------------------------------------------------------  Subjective       Chief Complaints:    Wound Infection (PT HAS A LARGE 4 CM IN DIAMETER WOULD ON HER RIGHT FOOT WITH DISCOLORATION THAT HAS BEEN PACKED FROM HER DR OFFICE )        Interval History:      Patient sitting up in bed this morning.  Overall feels significantly better today.  Currently on room air without apparent distress.  Significant other at bedside.  Lungs clear to auscultation bilaterally.  Abdomen soft, nontender.  Afebrile, denies diarrhea.  Per general surgery wound healing well and will not require any further debridements at this time.  Wound culture from 2023 remains in process although showing no growth thus far.  Blood cultures from 9/15/2023 show no growth thus far.  WBC normal at 9.64.    Review of Systems:    Constitutional: no fever, chills and night sweats.    Eyes: no eye drainage, itching or redness.  HEENT: no mouth sores, dysphagia or nose bleed.  Respiratory: no for shortness of breath, cough or production of sputum.  Cardiovascular: no chest pain, no palpitations, no orthopnea.  Gastrointestinal: no nausea, vomiting or diarrhea. No abdominal pain, hematemesis or rectal bleeding.  Genitourinary: no dysuria or polyuria.  Hematologic/lymphatic: no lymph node abnormalities, no easy bruising or easy bleeding.  Musculoskeletal: no muscle or joint pain.  Skin: No rash and no itching.  Neurological: no loss of consciousness, no seizure, no headache.  Behavioral/Psych: no depression or suicidal ideation.  Endocrine: no hot flashes.  Immunologic:  negative.    ----------------------------------------------------------------------------------------------------------------------      Objective       [facility] Meds:  cefTRIAXone, 2,000 mg, Intravenous, Q24H  enoxaparin, 40 mg, Subcutaneous, Daily  gabapentin, 600 mg, Oral, Q8H  mupirocin, 1 application , Topical, BID  senna-docusate sodium, 2 tablet, Oral, BID  sodium chloride, 10 mL, Intravenous, Q12H  vancomycin, 1,250 mg, Intravenous, Q12H         ----------------------------------------------------------------------------------------------------------------------    Vital Signs:  Temp:  [97.9 °F (36.6 °C)-98.6 °F (37 °C)] 98.1 °F (36.7 °C)  Heart Rate:  [59-97] 70  Resp:  [16-20] 16  BP: (104-130)/(57-82) 127/77  No data found.  SpO2 Percentage    09/16/23 1405 09/16/23 1505 09/17/23 0642   SpO2: 99% 99% 99%     SpO2:  [98 %-99 %] 99 %  on   ;   Device (Oxygen Therapy): room air    Body mass index is 17.52 kg/m².  Wt Readings from Last 3 Encounters:   09/15/23 53.8 kg (118 lb 9.7 oz)   03/05/20 54.9 kg (121 lb)   11/08/17 64.4 kg (142 lb)        Intake/Output Summary (Last 24 hours) at 9/17/2023 1122  Last data filed at 9/17/2023 0900  Gross per 24 hour   Intake 1760 ml   Output --   Net 1760 ml     Diet: Regular/House Diet; Texture: Regular Texture (IDDSI 7); Fluid Consistency: Thin (IDDSI 0)  ----------------------------------------------------------------------------------------------------------------------      Physical Exam:    Constitutional:  Well-developed and well-nourished.  No respiratory distress.  On room air.  Significant other at bedside.     HENT:  Head: Normocephalic and atraumatic.  Mouth:  Moist mucous membranes.    Eyes:  Conjunctivae and EOM are normal.  No scleral icterus.  Neck:  Neck supple.  No JVD present.    Cardiovascular:  Normal rate, regular rhythm and normal heart sounds with no murmur. No edema.  Pulmonary/Chest:  No respiratory distress, no wheezes, no crackles, with  normal breath sounds and good air movement.  Abdominal:  Soft.  Bowel sounds are normal.  No distension and no tenderness.   Musculoskeletal: Right foot edema, right foot tenderness, and no deformity.  No swelling or redness of joints.  Neurological:  Alert and oriented to person, place, and time.  No facial droop.  No slurred speech.   Skin:  Skin is warm and dry.  No rash noted.  No pallor.  Right foot edema, resolving erythema.  Dressing currently in place.  Psychiatric:  Normal mood and affect.  Behavior is normal.        ----------------------------------------------------------------------------------------------------------------------            Results from last 7 days   Lab Units 09/16/23  0523 09/15/23  1244   CRP mg/dL  --  17.45*   LACTATE mmol/L  --  1.2   WBC 10*3/mm3 9.64 10.77   HEMOGLOBIN g/dL 10.7* 12.5   HEMATOCRIT % 33.3* 37.4   MCV fL 92.2 89.3   MCHC g/dL 32.1 33.4   PLATELETS 10*3/mm3 315 325     Results from last 7 days   Lab Units 09/16/23  0523 09/15/23  1244   SODIUM mmol/L 138 133*   POTASSIUM mmol/L 4.4 3.3*   CHLORIDE mmol/L 107 96*   CO2 mmol/L 24.1 26.3   BUN mg/dL 4* 4*   CREATININE mg/dL 0.51* 0.57   CALCIUM mg/dL 9.0 9.3   GLUCOSE mg/dL 96 97   ALBUMIN g/dL 2.6* 3.4*   BILIRUBIN mg/dL 0.3 0.4   ALK PHOS U/L 76 86   AST (SGOT) U/L 14 12   ALT (SGPT) U/L 8 8   Estimated Creatinine Clearance: 134.5 mL/min (A) (by C-G formula based on SCr of 0.51 mg/dL (L)).  No results found for: AMMONIA    No results found for: HGBA1C, POCGLU  No results found for: HGBA1C  No results found for: TSH, FREET4    Blood Culture   Date Value Ref Range Status   09/15/2023 No growth at 24 hours  Preliminary   09/15/2023 No growth at 24 hours  Preliminary     No results found for: URINECX  Wound Culture   Date Value Ref Range Status   09/16/2023 No growth  Preliminary     No results found for: STOOLCX  No results found for: RESPCX  Pain Management Panel  More data may exist         Latest Ref Rng & Units  9/15/2023 11/8/2017   Pain Management Panel   Amphetamine, Urine Qual Negative Negative  Negative    Barbiturates Screen, Urine Negative Negative  Negative    Benzodiazepine Screen, Urine Negative Positive  Negative    Buprenorphine, Screen, Urine Negative Positive  Positive    Cocaine Screen, Urine Negative Negative  Negative    Fentanyl, Urine Negative Negative  -   Methadone Screen , Urine Negative Negative  Negative    Methamphetamine, Ur Negative Negative  -         ----------------------------------------------------------------------------------------------------------------------  Imaging Results (Last 24 Hours)       ** No results found for the last 24 hours. **            ----------------------------------------------------------------------------------------------------------------------    Pertinent Infectious Disease Results      Patient presented to [facility] on 9/15/2023 for evaluation of right foot wound.  Patient reported right foot wound started approximately 1 week ago with initial redness and swelling followed by a raised black nodule and significant pain.  Patient was seen by her PCP who ultimately lanced and packed the wound 2 days prior to admission to the hospital.  Video of PCP lancing the wound was visualized and white purulent material was expressed from the wound however unfortunately culture was not performed.  WBC 9.64.  CRP 17.45.  Lactic acid 1.2.  Blood cultures from 9/15/2023 currently in process.  Wound culture of the right foot currently in process.  CT of the lower extremity reports open injury to the dorsum of the foot air in the soft tissues as well as edema but no drainable fluid collection, no osteomyelitis.  Serial Doppler from 9/15/2023 reports moderate to severe peripheral artery vascular disease with monophasic waveforms identified throughout the right lower extremity, elevated peak systolic velocities identified throughout the right lower  extremity likely due to underlying multiple stenosis, no occluded arterial segment.           Assessment/Plan       Assessment         Right foot wound with abscess  Polysubstance abuse  History of aortic valve replacement      Plan      Patient sitting up in bed this morning.  Overall feels significantly better today.  Currently on room air without apparent distress.  Significant other at bedside.  Lungs clear to auscultation bilaterally.  Abdomen soft, nontender.  Afebrile, denies diarrhea.  Per general surgery wound healing well and will not require any further debridements at this time.  Wound culture from 9/16/2023 remains in process although showing no growth thus far.  Blood cultures from 9/15/2023 show no growth thus far.  WBC normal at 9.64.    In the setting of overall clinical and laboratory improvement and has incision and drainage of fluid collection within the foot has been drained Zosyn was de-escalated to ceftriaxone 2 g IV every 24 hours to continue with vancomycin pharmacy to dose pending finalization of culture results.  We will continue to follow closely and adjust antibiotic therapy as needed.      ANTIMICROBIAL THERAPY    amoxicillin - 875 MG  cefTRIAXone (ROCEPHIN) 2000 mg IVPB in 100 mL NS (VTB)  doxycycline - 100 MG  vancomycin     Code Status:   Code Status and Medical Interventions:   Ordered at: 09/15/23 1536     Code Status (Patient has no pulse and is not breathing):    CPR (Attempt to Resuscitate)     Medical Interventions (Patient has pulse or is breathing):    Full Support       FREDDY Betancourt  09/17/23  11:22 EDT

## 2023-09-17 NOTE — PROGRESS NOTES
Wound changed this morning. Very well appearing.Does not need further debridements.     Should not need additional IV pain medications with dressing changes anymore. This was discussed.     I will put in wound care orders for nursing to perform nighttime dressing changes. If wound well appearing tomorrow, may be able to discharge with extended duration of antibiotics per ID.     Awa Millan MD       General Surgeon  [facility]

## 2023-09-17 NOTE — PLAN OF CARE
Goal Outcome Evaluation:  Plan of Care Reviewed With: patient           Outcome Evaluation: Patient resting in bed, has ambulated independently this shift. Family at bedside, VSS on RA, dressing CDI. No acute changes. PRN meds given for pain. Will continue POC

## 2023-09-17 NOTE — PLAN OF CARE
Goal Outcome Evaluation:           Progress: no change  Outcome Evaluation: Pt. resting in bed at this time. Pt ambulated in room throughout shift. Pt. had complaints of pain this shift, PRN meds given, see MAR. Dressing clean, dry, and intact. Will continue with plan of care.

## 2023-09-17 NOTE — PROGRESS NOTES
continues on day 3 of vancomycin 1250 mg every 12 hours for her cellulitis.  A 13 hour post infusion level was reported as 7.4 mcg/mL.  This correlates with a steady state AUC ~ 445 mg/L.hr. This is less than desired and consistent with good clearance. Will increase the dosage to 1500 mg every 12 hours to target an AUC of 400-600 mg/L.hr.  Will obtain a repeat level in 48 hours to guide further dosing if continued.    Dimitry Curiel, Dany  09/17/23  11:56 EDT

## 2023-09-17 NOTE — PROGRESS NOTES
Patient Identification:  Name:  Mitchell Jay  Age:  33 y.o.  Sex:  female  :  1990  MRN:  [id number]  Visit Number:  [id number]  Primary Care Provider:  Kadi Archer APRN    Length of stay:  2    Subjective/Interval History/Consultants/Procedures     Chief Complaint:   Chief Complaint   Patient presents with    Wound Infection     PT HAS A LARGE 4 CM IN DIAMETER WOULD ON HER RIGHT FOOT WITH DISCOLORATION THAT HAS BEEN PACKED FROM HER DR OFFICE        Subjective/Interval History:    33 y.o. female who was admitted on 9/15/2023 with right foot wound    PMH is significant for  polysubstance abuse, history of aortic valve replacement, Hx CHF, hypertension   For complete admission information, please see history and physical.     Consultations:  General surgery   Infectious disease    Procedures/Scans:  Bedside debridement 9/15/23- Dr. Berry   I&D 23- Dr. Berry  CT right lower extremity w/ contrast   US arterial doppler, RLE  CTA lower extremities, pending    Today, the patient was seen and examined with spouse at bedside. She was sitting up eating lunch. Reports right foot feeling better today with less pain. Edema remains, no appreciable erythema in areas not covered by dressing. No new complaints noted.     Room location at the time of evaluation was 334.    ----------------------------------------------------------------------------------------------------------------------  Current Hospital Meds:  enoxaparin, 40 mg, Subcutaneous, Daily  gabapentin, 600 mg, Oral, Q8H  mupirocin, 1 application , Topical, BID  piperacillin-tazobactam, 3.375 g, Intravenous, Q8H  senna-docusate sodium, 2 tablet, Oral, BID  sodium chloride, 10 mL, Intravenous, Q12H  vancomycin, 1,250 mg, Intravenous, Q12H         ----------------------------------------------------------------------------------------------------------------------      Objective     Vital Signs:  Temp:  [97.5 °F (36.4 °C)-98.6 °F (37 °C)]  98.1 °F (36.7 °C)  Heart Rate:  [59-97] 70  Resp:  [16-26] 16  BP: (104-144)/(57-88) 127/77      09/15/23  1212 09/15/23  1707   Weight: 45.8 kg (101 lb) 53.8 kg (118 lb 9.7 oz)     Body mass index is 17.52 kg/m².    Intake/Output Summary (Last 24 hours) at 9/17/2023 0895  Last data filed at 9/17/2023 0350  Gross per 24 hour   Intake 1800 ml   Output --   Net 1800 ml     No intake/output data recorded.  Diet: Regular/House Diet; Texture: Regular Texture (IDDSI 7); Fluid Consistency: Thin (IDDSI 0)  ----------------------------------------------------------------------------------------------------------------------    Physical Exam  Vitals and nursing note reviewed.   Constitutional:       General: She is not in acute distress.  HENT:      Head: Normocephalic and atraumatic.   Eyes:      Extraocular Movements: Extraocular movements intact.      Conjunctiva/sclera: Conjunctivae normal.   Pulmonary:      Effort: Pulmonary effort is normal. No respiratory distress.   Abdominal:      General: Abdomen is flat.      Palpations: Abdomen is soft.   Musculoskeletal:      Right lower leg: Edema present.      Left lower leg: No edema.   Skin:     General: Skin is warm and dry.      Comments: R foot wound, dressing is clean and dry   Neurological:      Mental Status: She is alert. Mental status is at baseline.   Psychiatric:         Mood and Affect: Mood normal.         Behavior: Behavior normal.              ----------------------------------------------------------------------------------------------------------------------  Tele:      ----------------------------------------------------------------------------------------------------------------------      Results from last 7 days   Lab Units 09/16/23  0523 09/15/23  1534   CRP mg/dL  --  17.45*   LACTATE mmol/L  --  1.2   WBC 10*3/mm3 9.64 10.77   HEMOGLOBIN g/dL 10.7* 12.5   HEMATOCRIT % 33.3* 37.4   MCV fL 92.2 89.3   MCHC g/dL 32.1 33.4   PLATELETS 10*3/mm3 315 020          Results from last 7 days   Lab Units 09/16/23  0523 09/15/23  1244   SODIUM mmol/L 138 133*   POTASSIUM mmol/L 4.4 3.3*   CHLORIDE mmol/L 107 96*   CO2 mmol/L 24.1 26.3   BUN mg/dL 4* 4*   CREATININE mg/dL 0.51* 0.57   CALCIUM mg/dL 9.0 9.3   GLUCOSE mg/dL 96 97   ALBUMIN g/dL 2.6* 3.4*   BILIRUBIN mg/dL 0.3 0.4   ALK PHOS U/L 76 86   AST (SGOT) U/L 14 12   ALT (SGPT) U/L 8 8   Estimated Creatinine Clearance: 134.5 mL/min (A) (by C-G formula based on SCr of 0.51 mg/dL (L)).  No results found for: AMMONIA      Blood Culture   Date Value Ref Range Status   09/15/2023 No growth at 24 hours  Preliminary   09/15/2023 No growth at 24 hours  Preliminary     No results found for: URINECX  No results found for: WOUNDCX  No results found for: STOOLCX  ----------------------------------------------------------------------------------------------------------------------  Imaging Results (Last 24 Hours)       ** No results found for the last 24 hours. **          ----------------------------------------------------------------------------------------------------------------------   I have reviewed the above laboratory values for 09/17/23    Assessment/Plan     Active Hospital Problems    Diagnosis  POA    **Open wound of right lower extremity [S81.801A]  Yes         ASSESSMENT/PLAN:    Open wound of right lower extremity   Cellulitis of right foot  Patient presented for further evaluation of a nonhealing wound which she believes began as a spider bite.  She has been receiving wound care via her PCPs office without improvement and therefore was advised to seek care in the ED.  CRP was elevated at 17.45.  No leukocytosis, no fever on arrival.  CT of the right lower extremity notes open wound with air in the soft tissues and edema with no drainable fluid collection.  Ad[facility]  Continuing coverage with Vanc and zosyn for now.   General surgery consulted and following. Assistance is appreciated.    Patient underwent bedside debridement 9/15/23 and subsequent I&D 9/16/23. Surgery followed up this AM, feel wound healing appropriately, does not expect to need further debridement.   Wound culture is pending.   Blood cultures- TD  Infectious disease consulted and following as well. Assistance appreciated. Anticipate need for extended course of antibiotics. Have de-escalated zosyn to rocephin 2g daily pending finalization of culture results.   Remaining clinically stable, AM labs not yet drawn.  Arterial US of RLE did note concern for significant PAD with likely multiple underlying stenoses.   Continue to provide supportive care      Hypokalemia, POA, now resolved  Potassium 3.3 on arrival.  Replaced x1 in the ED.  Continue to monitor and replace electrolytes as needed.     Chronic:   Polysubstance abuse  Hx Aortic valve replacement  Hx CHF  Hypertension  Home meds continued as indicated  Monitor vital signs per protocol   Tox screen positive for opiates, buprenorphine, benzodiazepine, and THC  She denies injecting IV drugs      -----------  -DVT prophylaxis: Lovenox   -Disposition plans/anticipated needs: Pending clinical course and consultant's recommendations        The patient is high risk due to the following diagnoses/reasons:  Open wound to right foot        Juan Pablo Orozco PA-C  09/17/23  08:34 EDT

## 2023-09-18 ENCOUNTER — HOSPITAL ENCOUNTER (OUTPATIENT)
Dept: INFUSION THERAPY | Facility: HOSPITAL | Age: 33
Discharge: HOME | DRG: 603 | End: 2023-09-18
Admitting: INTERNAL MEDICINE
Payer: COMMERCIAL

## 2023-09-18 ENCOUNTER — APPOINTMENT (OUTPATIENT)
Dept: ULTRASOUND IMAGING | Facility: HOSPITAL | Age: 33
DRG: 603 | End: 2023-09-18
Payer: COMMERCIAL

## 2023-09-18 VITALS
SYSTOLIC BLOOD PRESSURE: 121 MMHG | HEART RATE: 70 BPM | TEMPERATURE: 97.5 F | DIASTOLIC BLOOD PRESSURE: 72 MMHG | RESPIRATION RATE: 16 BRPM | OXYGEN SATURATION: 97 %

## 2023-09-18 VITALS
OXYGEN SATURATION: 97 % | DIASTOLIC BLOOD PRESSURE: 68 MMHG | HEART RATE: 69 BPM | WEIGHT: 118.61 LBS | HEIGHT: 69 IN | SYSTOLIC BLOOD PRESSURE: 123 MMHG | RESPIRATION RATE: 16 BRPM | BODY MASS INDEX: 17.57 KG/M2 | TEMPERATURE: 97.5 F

## 2023-09-18 DIAGNOSIS — S81.801D OPEN WOUND OF RIGHT LOWER EXTREMITY, SUBSEQUENT ENCOUNTER: Primary | ICD-10-CM

## 2023-09-18 LAB
ALBUMIN SERPL-MCNC: 2.8 G/DL (ref 3.5–5.2)
ALBUMIN/GLOB SERPL: 0.8 G/DL
ALP SERPL-CCNC: 58 U/L (ref 39–117)
ALT SERPL W P-5'-P-CCNC: 8 U/L (ref 1–33)
ANION GAP SERPL CALCULATED.3IONS-SCNC: 12.1 MMOL/L (ref 5–15)
AST SERPL-CCNC: 16 U/L (ref 1–32)
BASOPHILS # BLD AUTO: 0.04 10*3/MM3 (ref 0–0.2)
BASOPHILS NFR BLD AUTO: 0.4 % (ref 0–1.5)
BILIRUB SERPL-MCNC: <0.2 MG/DL (ref 0–1.2)
BUN SERPL-MCNC: 6 MG/DL (ref 6–20)
BUN/CREAT SERPL: 13 (ref 7–25)
CALCIUM SPEC-SCNC: 8.8 MG/DL (ref 8.6–10.5)
CHLORIDE SERPL-SCNC: 105 MMOL/L (ref 98–107)
CO2 SERPL-SCNC: 21.9 MMOL/L (ref 22–29)
CREAT SERPL-MCNC: 0.46 MG/DL (ref 0.57–1)
DEPRECATED RDW RBC AUTO: 41.4 FL (ref 37–54)
EGFRCR SERPLBLD CKD-EPI 2021: 130.6 ML/MIN/1.73
EOSINOPHIL # BLD AUTO: 0.18 10*3/MM3 (ref 0–0.4)
EOSINOPHIL NFR BLD AUTO: 2 % (ref 0.3–6.2)
ERYTHROCYTE [DISTWIDTH] IN BLOOD BY AUTOMATED COUNT: 12.1 % (ref 12.3–15.4)
GLOBULIN UR ELPH-MCNC: 3.3 GM/DL
GLUCOSE SERPL-MCNC: 116 MG/DL (ref 65–99)
HCT VFR BLD AUTO: 30.7 % (ref 34–46.6)
HGB BLD-MCNC: 9.7 G/DL (ref 12–15.9)
IMM GRANULOCYTES # BLD AUTO: 0.04 10*3/MM3 (ref 0–0.05)
IMM GRANULOCYTES NFR BLD AUTO: 0.4 % (ref 0–0.5)
LYMPHOCYTES # BLD AUTO: 2.84 10*3/MM3 (ref 0.7–3.1)
LYMPHOCYTES NFR BLD AUTO: 31.2 % (ref 19.6–45.3)
MCH RBC QN AUTO: 29.4 PG (ref 26.6–33)
MCHC RBC AUTO-ENTMCNC: 31.6 G/DL (ref 31.5–35.7)
MCV RBC AUTO: 93 FL (ref 79–97)
MONOCYTES # BLD AUTO: 0.79 10*3/MM3 (ref 0.1–0.9)
MONOCYTES NFR BLD AUTO: 8.7 % (ref 5–12)
NEUTROPHILS NFR BLD AUTO: 5.21 10*3/MM3 (ref 1.7–7)
NEUTROPHILS NFR BLD AUTO: 57.3 % (ref 42.7–76)
NRBC BLD AUTO-RTO: 0 /100 WBC (ref 0–0.2)
PLATELET # BLD AUTO: 316 10*3/MM3 (ref 140–450)
PMV BLD AUTO: 9.2 FL (ref 6–12)
POTASSIUM SERPL-SCNC: 3.7 MMOL/L (ref 3.5–5.2)
PROT SERPL-MCNC: 6.1 G/DL (ref 6–8.5)
RBC # BLD AUTO: 3.3 10*6/MM3 (ref 3.77–5.28)
SODIUM SERPL-SCNC: 139 MMOL/L (ref 136–145)
WBC NRBC COR # BLD: 9.1 10*3/MM3 (ref 3.4–10.8)

## 2023-09-18 PROCEDURE — 93923 UPR/LXTR ART STDY 3+ LVLS: CPT

## 2023-09-18 PROCEDURE — 25010000002 MORPHINE PER 10 MG: Performed by: INTERNAL MEDICINE

## 2023-09-18 PROCEDURE — 25010000002 ENOXAPARIN PER 10 MG: Performed by: SURGERY

## 2023-09-18 PROCEDURE — 99232 SBSQ HOSP IP/OBS MODERATE 35: CPT | Performed by: INTERNAL MEDICINE

## 2023-09-18 PROCEDURE — 85025 COMPLETE CBC W/AUTO DIFF WBC: CPT | Performed by: INTERNAL MEDICINE

## 2023-09-18 PROCEDURE — 25010000002 DALBAVANCIN 500 MG RECONSTITUTED SOLUTION 1 EACH VIAL: Performed by: INTERNAL MEDICINE

## 2023-09-18 PROCEDURE — 80053 COMPREHEN METABOLIC PANEL: CPT | Performed by: INTERNAL MEDICINE

## 2023-09-18 PROCEDURE — 93923 UPR/LXTR ART STDY 3+ LVLS: CPT | Performed by: RADIOLOGY

## 2023-09-18 PROCEDURE — 25010000002 VANCOMYCIN 5 G RECONSTITUTED SOLUTION: Performed by: INTERNAL MEDICINE

## 2023-09-18 RX ORDER — DEXTROSE MONOHYDRATE 50 MG/ML
100 INJECTION, SOLUTION INTRAVENOUS ONCE
Status: COMPLETED | OUTPATIENT
Start: 2023-09-18 | End: 2023-09-18

## 2023-09-18 RX ORDER — GABAPENTIN 300 MG/1
600 CAPSULE ORAL EVERY 8 HOURS PRN
Qty: 18 CAPSULE | Refills: 0 | Status: SHIPPED | OUTPATIENT
Start: 2023-09-18

## 2023-09-18 RX ORDER — DEXTROSE MONOHYDRATE 50 MG/ML
100 INJECTION, SOLUTION INTRAVENOUS ONCE
Status: CANCELLED | OUTPATIENT
Start: 2023-09-19

## 2023-09-18 RX ORDER — DEXTROSE MONOHYDRATE 50 MG/ML
100 INJECTION, SOLUTION INTRAVENOUS ONCE
Status: CANCELLED | OUTPATIENT
Start: 2023-09-18

## 2023-09-18 RX ADMIN — VANCOMYCIN HYDROCHLORIDE 1500 MG: 500 INJECTION, POWDER, LYOPHILIZED, FOR SOLUTION INTRAVENOUS at 00:15

## 2023-09-18 RX ADMIN — GABAPENTIN 600 MG: 300 CAPSULE ORAL at 05:32

## 2023-09-18 RX ADMIN — DALBAVANCIN 1500 MG: 500 INJECTION, POWDER, FOR SOLUTION INTRAVENOUS at 14:50

## 2023-09-18 RX ADMIN — ENOXAPARIN SODIUM 40 MG: 40 INJECTION SUBCUTANEOUS at 08:00

## 2023-09-18 RX ADMIN — DEXTROSE MONOHYDRATE 100 ML: 50 INJECTION, SOLUTION INTRAVENOUS at 14:50

## 2023-09-18 RX ADMIN — MORPHINE SULFATE 2 MG: 2 INJECTION, SOLUTION INTRAMUSCULAR; INTRAVENOUS at 07:57

## 2023-09-18 RX ADMIN — DOCUSATE SODIUM 50 MG AND SENNOSIDES 8.6 MG 2 TABLET: 8.6; 5 TABLET, FILM COATED ORAL at 08:00

## 2023-09-18 NOTE — DISCHARGE INSTR - APPOINTMENTS
You have a scheduled follow-up appointment with Kadi HAYES on  9/26/23 at 11:00  Appointment with Jennifer Dunham  9/27 at 0800

## 2023-09-18 NOTE — PROGRESS NOTES
PROGRESS NOTE         Patient Identification:  Name:  Mitchell Jay  Age:  33 y.o.  Sex:  female  :  1990  MRN:  [id number]  Visit Number:  [id number]  Primary Care Provider:  Kadi Archer APRN         LOS: 3 days       ----------------------------------------------------------------------------------------------------------------------  Subjective       Chief Complaints:    Wound Infection (PT HAS A LARGE 4 CM IN DIAMETER WOULD ON HER RIGHT FOOT WITH DISCOLORATION THAT HAS BEEN PACKED FROM HER DR OFFICE )        Interval History:      Patient sitting up in bed this morning.  Complains of right lower extremity pain this morning.  Currently on room air with no apparent distress.  Lungs clear to auscultation bilaterally.  Abdomen soft, nontender.  Afebrile, no reported diarrhea.  WBC normal at 9.10.  Wound culture from 2023 preliminary reports growth of gram-positive cocci.    Review of Systems:    Constitutional: no fever, chills and night sweats.    Eyes: no eye drainage, itching or redness.  HEENT: no mouth sores, dysphagia or nose bleed.  Respiratory: no for shortness of breath, cough or production of sputum.  Cardiovascular: no chest pain, no palpitations, no orthopnea.  Gastrointestinal: no nausea, vomiting or diarrhea. No abdominal pain, hematemesis or rectal bleeding.  Genitourinary: no dysuria or polyuria.  Hematologic/lymphatic: no lymph node abnormalities, no easy bruising or easy bleeding.  Musculoskeletal: Right lower extremity pain.  Skin: No rash and no itching.  Neurological: no loss of consciousness, no seizure, no headache.  Behavioral/Psych: no depression or suicidal ideation.  Endocrine: no hot flashes.  Immunologic: negative.    ----------------------------------------------------------------------------------------------------------------------      Objective       Current [facility] Meds:  enoxaparin, 40 mg, Subcutaneous, Daily  gabapentin, 600 mg, Oral,  Q8H  mupirocin, 1 application , Topical, BID  senna-docusate sodium, 2 tablet, Oral, BID  sodium chloride, 10 mL, Intravenous, Q12H  vancomycin, 1,500 mg, Intravenous, Q12H         ----------------------------------------------------------------------------------------------------------------------    Vital Signs:  Temp:  [97.5 °F (36.4 °C)-98.4 °F (36.9 °C)] 97.5 °F (36.4 °C)  Heart Rate:  [63-69] 69  Resp:  [16-18] 16  BP: ()/(61-81) 123/68  No data found.  SpO2 Percentage    09/16/23 1505 09/17/23 0642 09/18/23 0644   SpO2: 99% 99% 97%     SpO2:  [97 %] 97 %  on   ;   Device (Oxygen Therapy): room air    Body mass index is 17.52 kg/m².  Wt Readings from Last 3 Encounters:   09/15/23 53.8 kg (118 lb 9.7 oz)   03/05/20 54.9 kg (121 lb)   11/08/17 64.4 kg (142 lb)        Intake/Output Summary (Last 24 hours) at 9/18/2023 1009  Last data filed at 9/18/2023 0500  Gross per 24 hour   Intake 1200 ml   Output --   Net 1200 ml       Diet: Regular/House Diet; Texture: Regular Texture (IDDSI 7); Fluid Consistency: Thin (IDDSI 0)  ----------------------------------------------------------------------------------------------------------------------      Physical Exam:    Constitutional:  Well-developed and well-nourished.  No respiratory distress.  On room air.  Significant other at bedside. Complains of right foot pain this morning.  HENT:  Head: Normocephalic and atraumatic.  Mouth:  Moist mucous membranes.    Eyes:  Conjunctivae and EOM are normal.  No scleral icterus.  Neck:  Neck supple.  No JVD present.    Cardiovascular:  Normal rate, regular rhythm and normal heart sounds with no murmur. No edema.  Pulmonary/Chest:  No respiratory distress, no wheezes, no crackles, with normal breath sounds and good air movement.  Abdominal:  Soft.  Bowel sounds are normal.  No distension and no tenderness.   Musculoskeletal: Right foot edema, right foot tenderness, and no deformity.  No swelling or redness of  joints.  Neurological:  Alert and oriented to person, place, and time.  No facial droop.  No slurred speech.   Skin:  Skin is warm and dry.  No rash noted.  No pallor.  Right foot edema, resolving erythema.  Dressing currently in place.  Psychiatric:  Normal mood and affect.  Behavior is normal.        ----------------------------------------------------------------------------------------------------------------------            Results from last 7 days   Lab Units 09/18/23 0042 09/17/23  1103 09/16/23  0523 09/15/23  1244   CRP mg/dL  --   --   --  17.45*   LACTATE mmol/L  --   --   --  1.2   WBC 10*3/mm3 9.10 9.95 9.64 10.77   HEMOGLOBIN g/dL 9.7* 11.2* 10.7* 12.5   HEMATOCRIT % 30.7* 35.7 33.3* 37.4   MCV fL 93.0 93.9 92.2 89.3   MCHC g/dL 31.6 31.4* 32.1 33.4   PLATELETS 10*3/mm3 316 353 315 325       Results from last 7 days   Lab Units 09/18/23 0042 09/17/23  1103 09/16/23  0523 09/15/23  1244   SODIUM mmol/L 139 138 138 133*   POTASSIUM mmol/L 3.7 4.1 4.4 3.3*   CHLORIDE mmol/L 105 104 107 96*   CO2 mmol/L 21.9* 21.5* 24.1 26.3   BUN mg/dL 6 4* 4* 4*   CREATININE mg/dL 0.46* 0.57 0.51* 0.57   CALCIUM mg/dL 8.8 8.8 9.0 9.3   GLUCOSE mg/dL 116* 100* 96 97   ALBUMIN g/dL 2.8*  --  2.6* 3.4*   BILIRUBIN mg/dL <0.2  --  0.3 0.4   ALK PHOS U/L 58  --  76 86   AST (SGOT) U/L 16  --  14 12   ALT (SGPT) U/L 8  --  8 8     Estimated Creatinine Clearance: 149.1 mL/min (A) (by C-G formula based on SCr of 0.46 mg/dL (L)).  No results found for: AMMONIA    No results found for: HGBA1C, POCGLU  No results found for: HGBA1C  No results found for: TSH, FREET4    Blood Culture   Date Value Ref Range Status   09/15/2023 No growth at 24 hours  Preliminary   09/15/2023 No growth at 24 hours  Preliminary     No results found for: URINECX  Wound Culture   Date Value Ref Range Status   09/16/2023 No growth  Preliminary     No results found for: STOOLCX  No results found for: RESPCX  Pain Management Panel  More data may exist          Latest Ref Rng & Units 9/15/2023 11/8/2017   Pain Management Panel   Amphetamine, Urine Qual Negative Negative  Negative    Barbiturates Screen, Urine Negative Negative  Negative    Benzodiazepine Screen, Urine Negative Positive  Negative    Buprenorphine, Screen, Urine Negative Positive  Positive    Cocaine Screen, Urine Negative Negative  Negative    Fentanyl, Urine Negative Negative  -   Methadone Screen , Urine Negative Negative  Negative    Methamphetamine, Ur Negative Negative  -       ----------------------------------------------------------------------------------------------------------------------  Imaging Results (Last 24 Hours)       Procedure Component Value Units Date/Time    US Arterial Doppler Lower Extremity Bilateral [[id number]] Resulted: 09/18/23 0955     Updated: 09/18/23 0955            ----------------------------------------------------------------------------------------------------------------------    Pertinent Infectious Disease Results      Patient presented to [facility] Emergency Department on 9/15/2023 for evaluation of right foot wound.  Patient reported right foot wound started approximately 1 week ago with initial redness and swelling followed by a raised black nodule and significant pain.  Patient was seen by her PCP who ultimately lanced and packed the wound 2 days prior to admission to the hospital.  Video of PCP lancing the wound was visualized and white purulent material was expressed from the wound however unfortunately culture was not performed.  WBC 9.64.  CRP 17.45.  Lactic acid 1.2.  Blood cultures from 9/15/2023 currently in process.  Wound culture of the right foot currently in process.  CT of the lower extremity reports open injury to the dorsum of the foot air in the soft tissues as well as edema but no drainable fluid collection, no osteomyelitis.  Serial Doppler from 9/15/2023 reports moderate to severe peripheral artery vascular disease with  monophasic waveforms identified throughout the right lower extremity, elevated peak systolic velocities identified throughout the right lower extremity likely due to underlying multiple stenosis, no occluded arterial segment.         Assessment/Plan       Assessment         Right foot wound with abscess  Polysubstance abuse  History of aortic valve replacement      Plan      I saw and examined the patient myself this morning with FREDDY Betancourt and discussed the findings and plan of care with her and got report from primary RN and here are my findings:    The patient is in an upright position in bed this morning but reports experiencing pain in the right lower extremity. They are currently breathing room air without any apparent distress. Lung auscultation reveals clear breath sounds bilaterally, and the abdomen remains soft and non-tender. The patient is afebrile, and there have been no reported episodes of diarrhea. The white blood cell count is within the normal range, measuring at 9.10.  Notably, preliminary results from the wound culture conducted on 9/16/2023 indicate the presence of gram-positive cocci. In light of these updated culture results, ceftriaxone has been discontinued from the patient's treatment plan. At present, our intention is to continue the administration of vancomycin following the pharmacy-dosing protocols.  For a potential earlier discharge to home, an alternative option would involve a one-time 1500 mg Dalvance infusion. This option would be accompanied by close outpatient surgical and infectious disease follow-up.        ANTIMICROBIAL THERAPY    amoxicillin - 875 MG  doxycycline - 100 MG  Vancomycin HCl in NaCl solution - 1.5-0.9 GM/500ML-%     Code Status:   Code Status and Medical Interventions:   Ordered at: 09/15/23 1536     Code Status (Patient has no pulse and is not breathing):    CPR (Attempt to Resuscitate)     Medical Interventions (Patient has pulse or is breathing):     Full Support       FREDDY Betancourt  09/18/23  10:09 EDT    Electronically signed by FREDDY Betancourt, 09/18/23, 10:11 AM EDT.  Electronically signed by Silvia Cuevas MD, 09/18/23, 5:37 PM EDT.

## 2023-09-18 NOTE — CASE MANAGEMENT/SOCIAL WORK
Discharge Planning Assessment   [place]     Patient Name: Mitchell Jay  MRN: [id number]  Today's Date: 9/18/2023    Admit Date: 9/15/2023    Plan: Spoke to infusion clinic and patient can go to receive Dalvance at 0900 on 9/19/2023.    Kelsea Mccain RN

## 2023-09-18 NOTE — ANESTHESIA POSTPROCEDURE EVALUATION
Patient: Mitchell Jay    Procedure Summary       Date: 09/16/23 Room / Location: [facility] OR 01 /  [facility] OR    Anesthesia Start: 1000 Anesthesia Stop: 1037    Procedure: INCISION AND DRAINAGE ABSCESS (Right: Abdomen) Diagnosis:       Open wound of right lower extremity, initial encounter      (Open wound of right lower extremity, initial encounter [S81.801A])    Surgeons: Awa Berry MD Provider: Justin Jenkins DO    Anesthesia Type: general ASA Status: 3 - Emergent            Anesthesia Type: general    Vitals  Vitals Value Taken Time   /79 09/16/23 1107   Temp 98.3 °F (36.8 °C) 09/16/23 1107   Pulse 60 09/16/23 1107   Resp 20 09/16/23 1107   SpO2 99 % 09/16/23 1107           Post Anesthesia Care and Evaluation    Patient location during evaluation: PHASE II  Patient participation: complete - patient participated  Level of consciousness: awake and alert  Pain score: 0  Pain management: adequate    Airway patency: patent  Anesthetic complications: No anesthetic complications  PONV Status: controlled  Cardiovascular status: acceptable  Respiratory status: acceptable and room air  Hydration status: euvolemic  No anesthesia care post op

## 2023-09-18 NOTE — DISCHARGE SUMMARY
[facility] HOSPITALIST DISCHARGE SUMMARY    Patient Identification:  Name:  Mitchell Jay  Age:  33 y.o.  Sex:  female  :  1990  MRN:  [id number]  Visit Number:  [id number]    Date of Admission: 9/15/2023  Date of Discharge:  23     PCP: Kadi Archer APRN    Discharging Provider: Josue Orozco PA-C / Dr. Grijalva    Discharge Diagnoses     Discharge Diagnoses:  Right foot wound with abscess, s/p surgical I&D  Polysubstance abuse     Secondary Diagnoses:  HTN  Hx CHF  Hx aortic valve replacement     Needs on follow up:  ID and general surgery follow up 1-2 weeks  Consults/Procedures     Consults:   Consults       Date and Time Order Name Status Description    9/15/2023  4:59 PM Inpatient Infectious Diseases Consult Completed     9/15/2023  4:59 PM Inpatient General Surgery Consult      9/15/2023  3:13 PM Inpatient General Surgery Consult Completed             Procedures/Scans Performed:  Procedure(s):  INCISION AND DRAINAGE ABSCESS       History of Presenting Illness     Chief Complaint   Patient presents with    Wound Infection     PT HAS A LARGE 4 CM IN DIAMETER WOULD ON HER RIGHT FOOT WITH DISCOLORATION THAT HAS BEEN PACKED FROM HER DR OFFICE        Patient is a 33 y.o. female who presented to [facility] complaining of wound infection.  Please see the admitting history and physical for further details.      Hospital Course     Patient was admitted to [facility] on 9/15/23 following presentation to [facility] ED for further evaluation of a right foot wound. She reported had the wound for approximately a week and was 2 days s/p lancing at her PCP's office though on follow up was advised to seek care in the ED due to worsening. She denied known cause, stated her PCP thought maybe a spider or snake bite. She does have history of IVDA but denies recent use. Drug screen positive for benzodiazepines, buprenorphine, opiates, and THC. CT of the right lower extremity in the ED noted open  wound, gas in the tissues surrounding but no fluid collection.     Patient was admitted to med surg for further management. Antimicrobial coverage initiated with vanc and zosyn. General surgery was consulted for further assistance. Patient did have bedside debridement on 9/15 and was held NPO at midnight for I&D in the OR on 9/16. Per Op note purulent drainage expressed from the wound with blunt dissection. The total dimensions of the wound were 8x6x2 cm. On follow up 1 day post op surgery was pleased with the appearance of the wound and noted no further debriding would be necessary. Of note we did obtain RLE arterial doppler on the date of admission which noted findings concerning for significant PAD. On further discussion with general surgery it was felt this was likely due to significant edema skewing the result. US art dopplers were repeated once edema resolving and were normal. Infectious disease has followed for assistance with antimicrobial management. Wound cultures have preliminarily grown gram positive cocci. As such vancomycin has been continued. Given surgery with no further planned intervention infectious disease has recommended dalvance infusion with close surgical and ID follow up in the outpatient setting. Case management has assisted in setting this up for the patient and she is scheduled to receive IV dalvance at 0900 9/19/23. Patient historically prescribed suboxone which was held on admission to allow for pain medications to be effective. Gabapentin was also started per general surgery. On discussion with the patient she expressed desire to resume her suboxone at discharge. As such will resume and add short course gabapentin. She will be scheduled for close follow up with PCP, general surgery, and infectious disease. The above discharge plan and medications were discussed with the patient and she was educated on risks and benefits. She verbalized agreement and understanding. Will be discharged  home on this date in stable condition.     Discharge Vitals/Physical Examination     Vital Signs:  Temp:  [97.5 °F (36.4 °C)-98.4 °F (36.9 °C)] 97.5 °F (36.4 °C)  Heart Rate:  [63-69] 69  Resp:  [16-18] 16  BP: ()/(61-81) 123/68  No data found.  SpO2 Percentage    09/16/23 1505 09/17/23 0642 09/18/23 0644   SpO2: 99% 99% 97%     SpO2:  [97 %] 97 %  on   ;   Device (Oxygen Therapy): room air    Body mass index is 17.52 kg/m².  Wt Readings from Last 3 Encounters:   09/15/23 53.8 kg (118 lb 9.7 oz)   03/05/20 54.9 kg (121 lb)   11/08/17 64.4 kg (142 lb)         Physical Exam:  Physical Exam  Vitals and nursing note reviewed.   Constitutional:       General: She is not in acute distress.  HENT:      Head: Normocephalic and atraumatic.   Eyes:      Extraocular Movements: Extraocular movements intact.      Conjunctiva/sclera: Conjunctivae normal.   Cardiovascular:      Rate and Rhythm: Normal rate and regular rhythm.   Pulmonary:      Effort: Pulmonary effort is normal. No respiratory distress.   Musculoskeletal:      Right lower leg: Edema present.      Left lower leg: No edema.   Skin:     General: Skin is warm and dry.      Comments: Would to right foot wrapped, dressing CDI   Neurological:      Mental Status: She is alert. Mental status is at baseline.   Psychiatric:         Mood and Affect: Mood normal.         Behavior: Behavior normal.       Pertinent Laboratory/Radiology Results     Pertinent Laboratory Results:            Results from last 7 days   Lab Units 09/18/23  0042 09/17/23  1103 09/16/23  0523 09/15/23  1244   CRP mg/dL  --   --   --  17.45*   LACTATE mmol/L  --   --   --  1.2   WBC 10*3/mm3 9.10 9.95 9.64 10.77   HEMOGLOBIN g/dL 9.7* 11.2* 10.7* 12.5   HEMATOCRIT % 30.7* 35.7 33.3* 37.4   MCV fL 93.0 93.9 92.2 89.3   MCHC g/dL 31.6 31.4* 32.1 33.4   PLATELETS 10*3/mm3 316 353 315 325     Results from last 7 days   Lab Units 09/18/23  0042 09/17/23  1103 09/16/23  0523 09/15/23  1244   SODIUM  mmol/L 139 138 138 133*   POTASSIUM mmol/L 3.7 4.1 4.4 3.3*   CHLORIDE mmol/L 105 104 107 96*   CO2 mmol/L 21.9* 21.5* 24.1 26.3   BUN mg/dL 6 4* 4* 4*   CREATININE mg/dL 0.46* 0.57 0.51* 0.57   CALCIUM mg/dL 8.8 8.8 9.0 9.3   GLUCOSE mg/dL 116* 100* 96 97   ALBUMIN g/dL 2.8*  --  2.6* 3.4*   BILIRUBIN mg/dL <0.2  --  0.3 0.4   ALK PHOS U/L 58  --  76 86   AST (SGOT) U/L 16  --  14 12   ALT (SGPT) U/L 8  --  8 8   Estimated Creatinine Clearance: 149.1 mL/min (A) (by C-G formula based on SCr of 0.46 mg/dL (L)).  No results found for: AMMONIA    No results found for: HGBA1C, POCGLU  No results found for: HGBA1C  No results found for: TSH, FREET4    Blood Culture   Date Value Ref Range Status   09/15/2023 No growth at 2 days  Preliminary   09/15/2023 No growth at 2 days  Preliminary     No results found for: URINECX  Wound Culture   Date Value Ref Range Status   09/16/2023 Rare Gram Positive Cocci (A)  Preliminary     No results found for: STOOLCX  No results found for: RESPCX  Pain Management Panel  More data may exist         Latest Ref Rng & Units 9/15/2023 11/8/2017   Pain Management Panel   Amphetamine, Urine Qual Negative Negative  Negative    Barbiturates Screen, Urine Negative Negative  Negative    Benzodiazepine Screen, Urine Negative Positive  Negative    Buprenorphine, Screen, Urine Negative Positive  Positive    Cocaine Screen, Urine Negative Negative  Negative    Fentanyl, Urine Negative Negative  -   Methadone Screen , Urine Negative Negative  Negative    Methamphetamine, Ur Negative Negative  -       Pertinent Radiology Results:  Imaging Results (All)       Procedure Component Value Units Date/Time    US Arterial Doppler Lower Extremity Bilateral [[id number]] Collected: 09/18/23 1021     Updated: 09/18/23 1025    Narrative:      EXAM:    US Duplex Bilateral Lower Extremities Arteries     EXAM DATE:    9/18/2023 10:01 AM     CLINICAL HISTORY:    Concern for PAD; L03.115-Cellulitis of right lower  limb;  S81.801A-Unspecified open wound, right lower leg, initial encounter     TECHNIQUE:    Real-time duplex ultrasound scan of the bilateral lower extremity  arteries integrating B-mode two-dimensional vascular structure, Doppler  spectral analysis and color flow Doppler imaging.     COMPARISON:    09/15/2023     FINDINGS:     Any stenoses are evaluated using the NASCETor similar method.          RIGHT COMMON FEMORAL ARTERY:  No acute findings.  Normal waveform.   Peak systolic velocity in the right common femoral artery is 99 cm/s.    RIGHT SUPERFICIAL FEMORAL ARTERY:  Peak systolic velocity in the right  superficial femoral artery is 158 cm/s consistent with a 30-49%  stenosis.    RIGHT POPLITEAL ARTERY:  No acute findings.  Normal waveform.  Peak  systolic velocity in the right popliteal artery is 92 cm/s.    RIGHT CALF/FOOT ARTERIES:  No acute findings.  Normal waveform.  Peak  systolic velocity in the right posterior tibial artery is 141 cm/s.       LEFT COMMON FEMORAL ARTERY:  No acute findings.  Normal waveform.   Peak systolic velocity in the left common femoral artery is 119 cm/s.    LEFT SUPERFICIAL FEMORAL ARTERY:  No acute findings.  Normal waveform.   Peak systolic velocity in the left superficial femoral artery is 115  cm/s.    LEFT POPLITEAL ARTERY:  No acute findings.  Normal waveform.  Peak  systolic velocity in the left popliteal artery is 113 cm/s.    LEFT CALF/FOOT ARTERIES:  Peak systolic velocity in the left posterior  tibial artery is 164 cm/s consistent with a 30-49% stenosis.       SOFT TISSUES:  Unremarkable.       Impression:        No acute findings in the bilateral lower extremity arteries.     This report was finalized on 9/18/2023 10:23 AM by Dr. Farhan Morrison MD.       US Arterial Doppler Lower Extremity Right [[id number]] Collected: 09/15/23 1810     Updated: 09/15/23 1814    Narrative:      Procedure: Arterial duplex ultrasound evaluation of the right lower  extremity performed on  09/15/2023. Color flow imaging performed.  Grayscale imaging performed. Waveform analysis performed.     HISTORY: Necrotic wound. Nonpalpable pulses.     FINDINGS:     Monophasic waveform at the right external iliac artery.  Monophasic waveform at the right common femoral artery.  Monophasic waveforms at the right superficial femoral artery, right  popliteal artery, and right posterior tibial artery. Elevated peak  systolic velocities identified throughout the right lower extremity.  No occluded arterial segment.       Impression:         1.  Moderate to severe peripheral arterial vascular disease with  monophasic waveforms identified throughout the right lower extremity.  2.  Elevated peak systolic velocities identified throughout the right  lower extremity likely due to underlying multiple stenoses.  3.  No occluded arterial segment.     This report was finalized on 9/15/2023 6:12 PM by Benigno Pettit MD.       CT Lower Extremity Left With Contrast [[id number]] Collected: 09/15/23 1427     Updated: 09/15/23 1430    Narrative:      EXAMINATION: CT LOWER EXTREMITY LEFT W CONTRAST-      CLINICAL INDICATION: Left foot        COMPARISON: None available.     Radiation dose reduction techniques were utilized per ALARA protocol.  Automated exposure control was initiated through either or [employer] or  DoseRight software packages by  protocol.           PROCEDURE:  Axial images through the left foot were acquired during IV contrast.  Reformatted images were created.     FINDINGS:  There is soft tissue injury with air in the soft tissues as well as  edema in the dorsum of the foot.     No underlying drainable fluid collection is seen     There are no enhancing masses     No osteomyelitis.       Impression:      1. Open injury to the dorsum of the left foot. Air in the soft tissues  as well as edema but no drainable fluid collection  2. No osteomyelitis      This report was finalized on 9/15/2023 2:28 PM by   Farhan Morrison MD.               Discharge Disposition/Discharge Medications/Discharge Appointments     Discharge Disposition:   Home or Self Care    Condition at Discharge:  Stable     Discharge Medications:     Your medication list        START taking these medications        Instructions Last Dose Given Next Dose Due   gabapentin 300 MG capsule  Commonly known as: NEURONTIN      Take 2 capsules by mouth Every 8 (Eight) Hours As Needed (severe pain).              CONTINUE taking these medications        Instructions Last Dose Given Next Dose Due   buprenorphine-naloxone 8-2 MG per SL tablet  Commonly known as: SUBOXONE      Place 1.75 tablets under the tongue Daily.       mupirocin 2 % ointment  Commonly known as: BACTROBAN      Apply 1 application  topically to the appropriate area as directed 2 (Two) Times a Day.              STOP taking these medications      amoxicillin 875 MG tablet  Commonly known as: AMOXIL        doxycycline 100 MG capsule  Commonly known as: VIBRAMYCIN        HYDROcodone-acetaminophen 7.5-325 MG per tablet  Commonly known as: NORCO                  Where to Get Your Medications        These medications were sent to [facility] [address] [address]      Hours: Monday to Friday 7 AM to 6 PM Phone: [phone]   gabapentin 300 MG capsule          Discharge Diet:  Diet Instructions    Regular diet             Discharge Activity:  Activity Instructions    As tolerated               Time spent on this discharge exceeded 30 minutes.

## 2023-09-18 NOTE — PLAN OF CARE
Goal Outcome Evaluation:           Progress: improving  Outcome Evaluation: Pt. resting in bed at this time. Wound care completed per orders. Pt had complaints of pain, PRN meds given, see MAR. VSS no acute changes at this time. Will continue with plan of care.

## 2023-09-19 ENCOUNTER — APPOINTMENT (OUTPATIENT)
Dept: INFUSION THERAPY | Facility: HOSPITAL | Age: 33
DRG: 603 | End: 2023-09-19
Payer: COMMERCIAL

## 2023-09-19 NOTE — CASE MANAGEMENT/SOCIAL WORK
Discharge Planning Assessment   Lionel     Patient Name: Mitchell Jay  MRN: [id number]  Today's Date: 9/19/2023    Admit Date: 9/15/2023            Discharge Plan       Row Name 09/19/23 1439       Plan    Final Note SS/CM received consult for Schedule patient for Dalvance 1500mg IV x1 dose tomorrow. SS noted, Spoke to infusion clinic and patient can go to receive Dalvance at 0900 on 9/19/2023.      Row Name 09/19/23 1438       Plan    Final Discharge Disposition Code 01 - home or self-care                    ROBERT Fish

## 2023-09-20 LAB
BACTERIA SPEC AEROBE CULT: ABNORMAL
BACTERIA SPEC AEROBE CULT: NORMAL
BACTERIA SPEC AEROBE CULT: NORMAL
GRAM STN SPEC: ABNORMAL
GRAM STN SPEC: ABNORMAL

## 2023-09-26 ENCOUNTER — OFFICE VISIT (OUTPATIENT)
Dept: SURGERY | Facility: CLINIC | Age: 33
End: 2023-09-26
Payer: COMMERCIAL

## 2023-09-26 VITALS — HEIGHT: 69 IN | WEIGHT: 118 LBS | BODY MASS INDEX: 17.48 KG/M2

## 2023-09-26 DIAGNOSIS — S81.801A OPEN WOUND OF RIGHT LOWER EXTREMITY, INITIAL ENCOUNTER: ICD-10-CM

## 2023-09-26 DIAGNOSIS — G62.9 NEUROPATHY: Primary | ICD-10-CM

## 2023-09-26 PROCEDURE — 1159F MED LIST DOCD IN RCRD: CPT | Performed by: SURGERY

## 2023-09-26 PROCEDURE — 1160F RVW MEDS BY RX/DR IN RCRD: CPT | Performed by: SURGERY

## 2023-09-26 PROCEDURE — 99213 OFFICE O/P EST LOW 20 MIN: CPT | Performed by: SURGERY

## 2023-09-26 RX ORDER — GABAPENTIN 800 MG/1
800 TABLET ORAL 3 TIMES DAILY
Qty: 42 TABLET | Refills: 0 | Status: SHIPPED | OUTPATIENT
Start: 2023-09-26 | End: 2023-10-10

## 2023-09-26 NOTE — PROGRESS NOTES
"Patient Name:  Mitchell Jay  YOB: 1990  [id number]    Follow Up Note    Date of visit: 9/26/2023    Subjective   Subjective: Ms Jay presents for follow-up of right lower extremity wound.  Patient has been has been performing local wound care consisting of twice daily wet-to-dry dressing changes.  She reports that it is significantly improved and she is having increased range of motion.  She complains of occasional stabbing, shooting pains into her foot.         Objective     Objective:     Ht 175.3 cm (69.02\")   Wt 53.5 kg (118 lb)   LMP 09/15/2023 (Exact Date)   BMI 17.42 kg/m²       CV:  Regular rate and rhythm  L:  Bilateral lung rise and fall, no use of accessory muscles   Abd:  Soft, nontender, nondistended  Ext:  No cyanosis, clubbing, edema  Right lower extremity: Open wound on anterior surface of foot.  No surrounding erythema.  Granulation tissue present over top of wound.      Assessment/ Plan:  Assessment   Diagnoses and all orders for this visit:    1. Neuropathy (Primary)  -     gabapentin (Neurontin) 800 MG tablet; Take 1 tablet by mouth 3 (Three) Times a Day for 14 days.  Dispense: 42 tablet; Refill: 0    2. Open wound of right lower extremity, initial encounter    Ms Jay is a 33-year-old female who presents for continued wound care follow-up of a right lower extremity open wound.  She is healing remarkably well.  I have encouraged them to continue twice daily wet-to-dry wound care changes.  She reports significant symptoms of neuropathy and for this I have prescribed gabapentin.  Once the wound completely granulates then, she may require a skin graft for coverage.  We will see her back in 2 weeks for repeat wound check    Awa Millan MD       General Surgeon  [facility]       "

## 2023-10-12 ENCOUNTER — TELEPHONE (OUTPATIENT)
Dept: SURGERY | Facility: CLINIC | Age: 33
End: 2023-10-12
Payer: COMMERCIAL

## 2023-10-12 NOTE — TELEPHONE ENCOUNTER
Patient called in requesting a prescription for Gabapentin. Per Dr. GODDARD patient is to keep her apts and will discuss Gabapentin refill at next apt 10/17. Patient verbalized understanding.

## 2023-10-24 ENCOUNTER — OFFICE VISIT (OUTPATIENT)
Dept: SURGERY | Facility: CLINIC | Age: 33
End: 2023-10-24
Payer: COMMERCIAL

## 2023-10-24 VITALS — WEIGHT: 118 LBS | BODY MASS INDEX: 17.48 KG/M2 | HEIGHT: 69 IN

## 2023-10-24 DIAGNOSIS — S91.331S PENETRATING FOOT WOUND, RIGHT, SEQUELA: Primary | ICD-10-CM

## 2023-10-24 PROCEDURE — 1160F RVW MEDS BY RX/DR IN RCRD: CPT | Performed by: SURGERY

## 2023-10-24 PROCEDURE — 1159F MED LIST DOCD IN RCRD: CPT | Performed by: SURGERY

## 2023-10-24 PROCEDURE — 99024 POSTOP FOLLOW-UP VISIT: CPT | Performed by: SURGERY

## 2023-10-24 RX ORDER — TRAMADOL HYDROCHLORIDE 50 MG/1
50 TABLET ORAL EVERY 6 HOURS PRN
Qty: 25 TABLET | Refills: 0 | Status: SHIPPED | OUTPATIENT
Start: 2023-10-24 | End: 2024-10-23

## 2023-10-24 RX ORDER — GABAPENTIN 800 MG/1
800 TABLET ORAL 3 TIMES DAILY
Qty: 60 TABLET | Refills: 2 | Status: SHIPPED | OUTPATIENT
Start: 2023-10-24 | End: 2024-10-23

## 2023-10-24 RX ORDER — GABAPENTIN 800 MG/1
800 TABLET ORAL 3 TIMES DAILY
Qty: 60 TABLET | Refills: 2 | Status: SHIPPED | OUTPATIENT
Start: 2023-10-24 | End: 2023-10-24 | Stop reason: SDUPTHER

## 2023-10-24 RX ORDER — TRAMADOL HYDROCHLORIDE 50 MG/1
50 TABLET ORAL EVERY 6 HOURS PRN
Qty: 25 TABLET | Refills: 0 | Status: SHIPPED | OUTPATIENT
Start: 2023-10-24 | End: 2023-10-24 | Stop reason: SDUPTHER

## 2023-10-24 NOTE — PROGRESS NOTES
"Patient Name:  Mitchell Jay  YOB: 1990  [id number]    Follow Up Note    Date of visit: 10/24/2023    Subjective   Subjective: Presents for follow up appointment and wound check.  She continues to perform daily wound care.  She continues to complain of weakness and pain in her right lower extremity.       Objective     Objective:     Ht 175.3 cm (69.02\")   Wt 53.5 kg (118 lb)   BMI 17.42 kg/m²       CV:  Regular rate and rhythm  L:  Bilateral lung rise and fall, no use of accessory muscles   Abd:  Soft, nontender, nondistended  Ext:  No cyanosis, clubbing, edema  RLE: Palpable DP and PT pulses, granulated wound on right foot      Assessment/ Plan:  Assessment   Diagnoses and all orders for this visit:    1. Penetrating foot wound, right, sequela (Primary)  -     Ambulatory Referral to Orthopedic Surgery  -     traMADol (Ultram) 50 MG tablet; Take 1 tablet by mouth Every 6 (Six) Hours As Needed for Severe Pain.  Dispense: 25 tablet; Refill: 0  -     gabapentin (Neurontin) 800 MG tablet; Take 1 tablet by mouth 3 (Three) Times a Day.  Dispense: 60 tablet; Refill: 2      Ms Jay is a 33-year-old female who presents for a wound check on her right foot.  She continues to have improvement in the appearance of the wound.  Her neurological dysfunction and mobility have not improved as I would have liked.  I will plan to refer her to orthopedic surgery for evaluation of her right ankle.  I will plan to see her back in about 1 month for further evaluation of her wound and consider ration for possible skin graft.    Awa Millan MD       General Surgeon  [facility]       "

## 2023-12-20 DIAGNOSIS — M25.571 RIGHT ANKLE PAIN, UNSPECIFIED CHRONICITY: Primary | ICD-10-CM

## 2024-04-01 ENCOUNTER — TRANSCRIBE ORDERS (OUTPATIENT)
Dept: ADMINISTRATIVE | Facility: HOSPITAL | Age: 34
End: 2024-04-01
Payer: COMMERCIAL

## 2024-04-01 DIAGNOSIS — M79.671 RIGHT FOOT PAIN: Primary | ICD-10-CM

## 2024-05-16 ENCOUNTER — APPOINTMENT (OUTPATIENT)
Dept: GENERAL RADIOLOGY | Facility: HOSPITAL | Age: 34
End: 2024-05-16
Payer: COMMERCIAL

## 2024-05-16 ENCOUNTER — HOSPITAL ENCOUNTER (EMERGENCY)
Facility: HOSPITAL | Age: 34
Discharge: HOME | End: 2024-05-16
Attending: STUDENT IN AN ORGANIZED HEALTH CARE EDUCATION/TRAINING PROGRAM
Payer: COMMERCIAL

## 2024-05-16 VITALS
OXYGEN SATURATION: 100 % | BODY MASS INDEX: 16.89 KG/M2 | TEMPERATURE: 98.3 F | HEART RATE: 77 BPM | SYSTOLIC BLOOD PRESSURE: 109 MMHG | HEIGHT: 70 IN | RESPIRATION RATE: 16 BRPM | WEIGHT: 118 LBS | DIASTOLIC BLOOD PRESSURE: 68 MMHG

## 2024-05-16 DIAGNOSIS — L02.91 ABSCESS: Primary | ICD-10-CM

## 2024-05-16 LAB
ALBUMIN SERPL-MCNC: 3.8 G/DL (ref 3.5–5.2)
ALBUMIN/GLOB SERPL: 1 G/DL
ALP SERPL-CCNC: 95 U/L (ref 39–117)
ALT SERPL W P-5'-P-CCNC: 26 U/L (ref 1–33)
ANION GAP SERPL CALCULATED.3IONS-SCNC: 12.3 MMOL/L (ref 5–15)
AST SERPL-CCNC: 52 U/L (ref 1–32)
BASOPHILS # BLD AUTO: 0.04 10*3/MM3 (ref 0–0.2)
BASOPHILS NFR BLD AUTO: 0.4 % (ref 0–1.5)
BILIRUB SERPL-MCNC: 0.6 MG/DL (ref 0–1.2)
BUN SERPL-MCNC: 3 MG/DL (ref 6–20)
BUN/CREAT SERPL: 5.2 (ref 7–25)
CALCIUM SPEC-SCNC: 9.1 MG/DL (ref 8.6–10.5)
CHLORIDE SERPL-SCNC: 94 MMOL/L (ref 98–107)
CO2 SERPL-SCNC: 27.7 MMOL/L (ref 22–29)
CREAT SERPL-MCNC: 0.58 MG/DL (ref 0.57–1)
D-LACTATE SERPL-SCNC: 1.7 MMOL/L (ref 0.5–2)
D-LACTATE SERPL-SCNC: 3.3 MMOL/L (ref 0.5–2)
DEPRECATED RDW RBC AUTO: 37.9 FL (ref 37–54)
EGFRCR SERPLBLD CKD-EPI 2021: 123.5 ML/MIN/1.73
EOSINOPHIL # BLD AUTO: 0.08 10*3/MM3 (ref 0–0.4)
EOSINOPHIL NFR BLD AUTO: 0.7 % (ref 0.3–6.2)
ERYTHROCYTE [DISTWIDTH] IN BLOOD BY AUTOMATED COUNT: 11.8 % (ref 12.3–15.4)
GLOBULIN UR ELPH-MCNC: 3.8 GM/DL
GLUCOSE SERPL-MCNC: 108 MG/DL (ref 65–99)
HCT VFR BLD AUTO: 39.5 % (ref 34–46.6)
HGB BLD-MCNC: 13.4 G/DL (ref 12–15.9)
HOLD SPECIMEN: NORMAL
HOLD SPECIMEN: NORMAL
IMM GRANULOCYTES # BLD AUTO: 0.05 10*3/MM3 (ref 0–0.05)
IMM GRANULOCYTES NFR BLD AUTO: 0.5 % (ref 0–0.5)
LYMPHOCYTES # BLD AUTO: 2.71 10*3/MM3 (ref 0.7–3.1)
LYMPHOCYTES NFR BLD AUTO: 24.5 % (ref 19.6–45.3)
MAGNESIUM SERPL-MCNC: 1.8 MG/DL (ref 1.6–2.6)
MCH RBC QN AUTO: 30 PG (ref 26.6–33)
MCHC RBC AUTO-ENTMCNC: 33.9 G/DL (ref 31.5–35.7)
MCV RBC AUTO: 88.4 FL (ref 79–97)
MONOCYTES # BLD AUTO: 1.37 10*3/MM3 (ref 0.1–0.9)
MONOCYTES NFR BLD AUTO: 12.4 % (ref 5–12)
NEUTROPHILS NFR BLD AUTO: 6.83 10*3/MM3 (ref 1.7–7)
NEUTROPHILS NFR BLD AUTO: 61.5 % (ref 42.7–76)
NRBC BLD AUTO-RTO: 0 /100 WBC (ref 0–0.2)
PLATELET # BLD AUTO: 202 10*3/MM3 (ref 140–450)
PMV BLD AUTO: 9.8 FL (ref 6–12)
POTASSIUM SERPL-SCNC: 3 MMOL/L (ref 3.5–5.2)
PROT SERPL-MCNC: 7.6 G/DL (ref 6–8.5)
RBC # BLD AUTO: 4.47 10*6/MM3 (ref 3.77–5.28)
SODIUM SERPL-SCNC: 134 MMOL/L (ref 136–145)
WBC NRBC COR # BLD AUTO: 11.08 10*3/MM3 (ref 3.4–10.8)
WHOLE BLOOD HOLD COAG: NORMAL
WHOLE BLOOD HOLD SPECIMEN: NORMAL

## 2024-05-16 PROCEDURE — 25010000002 KETOROLAC TROMETHAMINE PER 15 MG: Performed by: PHYSICIAN ASSISTANT

## 2024-05-16 PROCEDURE — 25010000002 DALBAVANCIN 500 MG RECONSTITUTED SOLUTION 1 EACH VIAL: Performed by: PHYSICIAN ASSISTANT

## 2024-05-16 PROCEDURE — 87040 BLOOD CULTURE FOR BACTERIA: CPT | Performed by: PHYSICIAN ASSISTANT

## 2024-05-16 PROCEDURE — 99283 EMERGENCY DEPT VISIT LOW MDM: CPT

## 2024-05-16 PROCEDURE — 96367 TX/PROPH/DG ADDL SEQ IV INF: CPT

## 2024-05-16 PROCEDURE — 80053 COMPREHEN METABOLIC PANEL: CPT | Performed by: PHYSICIAN ASSISTANT

## 2024-05-16 PROCEDURE — 0 DEXTROSE 5 % SOLUTION 500 ML FLEX CONT: Performed by: PHYSICIAN ASSISTANT

## 2024-05-16 PROCEDURE — 96375 TX/PRO/DX INJ NEW DRUG ADDON: CPT

## 2024-05-16 PROCEDURE — 73610 X-RAY EXAM OF ANKLE: CPT | Performed by: RADIOLOGY

## 2024-05-16 PROCEDURE — 25010000002 VANCOMYCIN 1 G RECONSTITUTED SOLUTION 1 EACH VIAL: Performed by: PHYSICIAN ASSISTANT

## 2024-05-16 PROCEDURE — 25810000003 SODIUM CHLORIDE 0.9 % SOLUTION 250 ML FLEX CONT: Performed by: PHYSICIAN ASSISTANT

## 2024-05-16 PROCEDURE — 83605 ASSAY OF LACTIC ACID: CPT | Performed by: PHYSICIAN ASSISTANT

## 2024-05-16 PROCEDURE — 73610 X-RAY EXAM OF ANKLE: CPT

## 2024-05-16 PROCEDURE — 36415 COLL VENOUS BLD VENIPUNCTURE: CPT

## 2024-05-16 PROCEDURE — 96365 THER/PROPH/DIAG IV INF INIT: CPT

## 2024-05-16 PROCEDURE — 85025 COMPLETE CBC W/AUTO DIFF WBC: CPT | Performed by: PHYSICIAN ASSISTANT

## 2024-05-16 PROCEDURE — 25810000003 SODIUM CHLORIDE 0.9 % SOLUTION: Performed by: PHYSICIAN ASSISTANT

## 2024-05-16 PROCEDURE — 83735 ASSAY OF MAGNESIUM: CPT | Performed by: STUDENT IN AN ORGANIZED HEALTH CARE EDUCATION/TRAINING PROGRAM

## 2024-05-16 RX ORDER — POTASSIUM CHLORIDE 20 MEQ/1
40 TABLET, EXTENDED RELEASE ORAL ONCE
Status: COMPLETED | OUTPATIENT
Start: 2024-05-16 | End: 2024-05-16

## 2024-05-16 RX ORDER — DEXTROSE MONOHYDRATE 50 MG/ML
50 INJECTION, SOLUTION INTRAVENOUS ONCE
Status: COMPLETED | OUTPATIENT
Start: 2024-05-16 | End: 2024-05-16

## 2024-05-16 RX ORDER — IBUPROFEN 600 MG/1
600 TABLET ORAL EVERY 6 HOURS PRN
Qty: 15 TABLET | Refills: 0 | Status: SHIPPED | OUTPATIENT
Start: 2024-05-16

## 2024-05-16 RX ORDER — KETOROLAC TROMETHAMINE 30 MG/ML
30 INJECTION, SOLUTION INTRAMUSCULAR; INTRAVENOUS ONCE
Status: COMPLETED | OUTPATIENT
Start: 2024-05-16 | End: 2024-05-16

## 2024-05-16 RX ORDER — ACETAMINOPHEN 500 MG
1000 TABLET ORAL EVERY 6 HOURS PRN
Qty: 15 TABLET | Refills: 0 | Status: SHIPPED | OUTPATIENT
Start: 2024-05-16

## 2024-05-16 RX ADMIN — POTASSIUM CHLORIDE 40 MEQ: 1500 TABLET, EXTENDED RELEASE ORAL at 15:17

## 2024-05-16 RX ADMIN — SODIUM CHLORIDE 1000 ML: 9 INJECTION, SOLUTION INTRAVENOUS at 15:17

## 2024-05-16 RX ADMIN — VANCOMYCIN HYDROCHLORIDE 1000 MG: 1 INJECTION, POWDER, FOR SOLUTION INTRAVENOUS at 14:59

## 2024-05-16 RX ADMIN — DEXTROSE MONOHYDRATE 50 ML: 50 INJECTION, SOLUTION INTRAVENOUS at 17:40

## 2024-05-16 RX ADMIN — DEXTROSE MONOHYDRATE 50 ML: 50 INJECTION, SOLUTION INTRAVENOUS at 18:33

## 2024-05-16 RX ADMIN — KETOROLAC TROMETHAMINE 30 MG: 30 INJECTION, SOLUTION INTRAMUSCULAR; INTRAVENOUS at 15:00

## 2024-05-16 NOTE — ED PROVIDER NOTES
Subjective   History of Present Illness  Pt c/o large area of swelling, redness, heat back of L ankle x 5 days, states she is broke out in a rash all over from poison sumac, reports brown recluse bite R ankle that has been treated.  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     History provided by:  Patient   used: No    Abscess  Location:  Leg  Leg abscess location:  L ankle  Abscess quality: draining, itching, painful, redness and warmth    Red streaking: no    Duration:  5 days  Progression:  Worsening  Pain details:     Quality:  Dull    Severity:  Moderate    Timing:  Constant    Progression:  Worsening  Chronicity:  New  Context: insect bite/sting    Context: not injected drug use    Relieved by:  Nothing  Worsened by:  Nothing  Ineffective treatments:  None tried  Risk factors: hx of MRSA and prior abscess        Review of Systems    Past Medical History:   Diagnosis Date    Congestive heart failure     Hepatitis C     Hypertension        Allergies   Allergen Reactions    Sulfamethoxazole Anaphylaxis    Trimethoprim Anaphylaxis       Past Surgical History:   Procedure Laterality Date     SECTION WITH TUBAL Bilateral 2017    Procedure:  SECTION REPEAT WITH TUBAL;  Surgeon: Jonas Wray MD;  Location: [facility] LABOR DELIVERY;  Service:     INCISION AND DRAINAGE ABSCESS Right 2023    Procedure: INCISION AND DRAINAGE ABSCESS;  Surgeon: Awa Berry MD;  Location: [facility] OR;  Service: General;  Laterality: Right;       No family history on file.    Social History     Socioeconomic History    Marital status:    Tobacco Use    Smoking status: Every Day     Current packs/day: 1.00     Types: Cigarettes    Smokeless tobacco: Never   Vaping Use    Vaping status: Unknown   Substance and Sexual Activity    Alcohol use: No    Drug use: Yes     Types: Benzodiazepines, Marijuana, Methamphetamines, Oxycodone    Sexual activity: Defer     Partners: Male      Birth control/protection: Surgical           Objective   Physical Exam  Vitals and nursing note reviewed.   Constitutional:       Appearance: She is well-developed.   HENT:      Head: Normocephalic.   Cardiovascular:      Rate and Rhythm: Normal rate and regular rhythm.   Pulmonary:      Effort: Pulmonary effort is normal.      Breath sounds: Normal breath sounds.   Abdominal:      General: Bowel sounds are normal.      Palpations: Abdomen is soft.      Tenderness: There is no abdominal tenderness.   Musculoskeletal:         General: Normal range of motion.      Cervical back: Neck supple.   Skin:     General: Skin is warm and dry.   Neurological:      Mental Status: She is alert and oriented to person, place, and time.   Psychiatric:         Behavior: Behavior normal.         Thought Content: Thought content normal.         Judgment: Judgment normal.         Incision & Drainage    Date/Time: 5/19/2024 11:14 PM    Performed by: Kayla Felipe PA  Authorized by: Catalina Mendoza DO    Consent:     Consent obtained:  Verbal    Consent given by:  Patient    Risks discussed:  Incomplete drainage and pain  Location:     Type:  Abscess    Size:  6    Location:  Lower extremity    Lower extremity location:  Ankle    Ankle location:  L ankle  Pre-procedure details:     Skin preparation:  Betadine  Anesthesia:     Anesthesia method:  Local infiltration    Local anesthetic:  Lidocaine 1% w/o epi  Procedure type:     Complexity:  Complex  Procedure details:     Needle aspiration: no      Incision types:  Stab incision    Incision depth:  Dermal    Drainage:  Purulent    Drainage amount:  Copious    Wound treatment:  Wound left open    Packing materials:  None  Post-procedure details:     Procedure completion:  Tolerated well, no immediate complications             ED Course      Results for orders placed or performed during the hospital encounter of 05/16/24   Blood Culture - Blood, Arm, Left    Specimen: Arm, Left; Blood    Result Value Ref Range    Blood Culture No growth at 3 days    Blood Culture - Blood, Arm, Right    Specimen: Arm, Right; Blood   Result Value Ref Range    Blood Culture No growth at 3 days    Comprehensive Metabolic Panel    Specimen: Arm, Right; Blood   Result Value Ref Range    Glucose 108 (H) 65 - 99 mg/dL    BUN 3 (L) 6 - 20 mg/dL    Creatinine 0.58 0.57 - 1.00 mg/dL    Sodium 134 (L) 136 - 145 mmol/L    Potassium 3.0 (L) 3.5 - 5.2 mmol/L    Chloride 94 (L) 98 - 107 mmol/L    CO2 27.7 22.0 - 29.0 mmol/L    Calcium 9.1 8.6 - 10.5 mg/dL    Total Protein 7.6 6.0 - 8.5 g/dL    Albumin 3.8 3.5 - 5.2 g/dL    ALT (SGPT) 26 1 - 33 U/L    AST (SGOT) 52 (H) 1 - 32 U/L    Alkaline Phosphatase 95 39 - 117 U/L    Total Bilirubin 0.6 0.0 - 1.2 mg/dL    Globulin 3.8 gm/dL    A/G Ratio 1.0 g/dL    BUN/Creatinine Ratio 5.2 (L) 7.0 - 25.0    Anion Gap 12.3 5.0 - 15.0 mmol/L    eGFR 123.5 >60.0 mL/min/1.73   Lactic Acid, Plasma    Specimen: Arm, Right; Blood   Result Value Ref Range    Lactate 3.3 (C) 0.5 - 2.0 mmol/L   CBC Auto Differential    Specimen: Arm, Right; Blood   Result Value Ref Range    WBC 11.08 (H) 3.40 - 10.80 10*3/mm3    RBC 4.47 3.77 - 5.28 10*6/mm3    Hemoglobin 13.4 12.0 - 15.9 g/dL    Hematocrit 39.5 34.0 - 46.6 %    MCV 88.4 79.0 - 97.0 fL    MCH 30.0 26.6 - 33.0 pg    MCHC 33.9 31.5 - 35.7 g/dL    RDW 11.8 (L) 12.3 - 15.4 %    RDW-SD 37.9 37.0 - 54.0 fl    MPV 9.8 6.0 - 12.0 fL    Platelets 202 140 - 450 10*3/mm3    Neutrophil % 61.5 42.7 - 76.0 %    Lymphocyte % 24.5 19.6 - 45.3 %    Monocyte % 12.4 (H) 5.0 - 12.0 %    Eosinophil % 0.7 0.3 - 6.2 %    Basophil % 0.4 0.0 - 1.5 %    Immature Grans % 0.5 0.0 - 0.5 %    Neutrophils, Absolute 6.83 1.70 - 7.00 10*3/mm3    Lymphocytes, Absolute 2.71 0.70 - 3.10 10*3/mm3    Monocytes, Absolute 1.37 (H) 0.10 - 0.90 10*3/mm3    Eosinophils, Absolute 0.08 0.00 - 0.40 10*3/mm3    Basophils, Absolute 0.04 0.00 - 0.20 10*3/mm3    Immature Grans, Absolute 0.05 0.00 -  0.05 10*3/mm3    nRBC 0.0 0.0 - 0.2 /100 WBC   STAT Lactic Acid, Reflex    Specimen: Blood   Result Value Ref Range    Lactate 1.7 0.5 - 2.0 mmol/L   Magnesium    Specimen: Arm, Right; Blood   Result Value Ref Range    Magnesium 1.8 1.6 - 2.6 mg/dL   Green Top (Gel)   Result Value Ref Range    Extra Tube Hold for add-ons.    Lavender Top   Result Value Ref Range    Extra Tube hold for add-on    Gold Top - SST   Result Value Ref Range    Extra Tube Hold for add-ons.    Light Blue Top   Result Value Ref Range    Extra Tube Hold for add-ons.       XR Ankle 3+ View Left   Final Result   1.  No acute fracture or dislocation.   2.  Lateral soft tissue edema.           This report was finalized on 5/16/2024 2:32 PM by Dr. Dickson Lozano MD.                                                  Medical Decision Making  Problems Addressed:  Abscess: complicated acute illness or injury    Amount and/or Complexity of Data Reviewed  Labs: ordered.  Radiology: ordered.    Risk  OTC drugs.  Prescription drug management.        Final diagnoses:   Abscess       ED Disposition  ED Disposition       ED Disposition   Discharge    Condition   Stable    Comment   --               Ivonne Andrews, APRN  [address] [id number] [phone]    In 2 days           Medication List        New Prescriptions      acetaminophen 500 MG tablet  Commonly known as: TYLENOL  Take 2 tablets by mouth Every 6 (Six) Hours As Needed for Mild Pain.     ibuprofen 600 MG tablet  Commonly known as: ADVIL,MOTRIN  Take 1 tablet by mouth Every 6 (Six) Hours As Needed for Moderate Pain.               Where to Get Your Medications        These medications were sent to [facility] [place] - [address] [phone] [place] [phone]   [address] [facility] [address] [facility] [address]      Phone: [phone]   acetaminophen 500 MG tablet  ibuprofen 600 MG tablet            Kayla Felipe PA  05/19/24 [id number]

## 2024-05-21 LAB
BACTERIA SPEC AEROBE CULT: NORMAL
BACTERIA SPEC AEROBE CULT: NORMAL

## (undated) DEVICE — BANDAGE,GAUZE,BULKEE II,2.25"X3YD,STRL: Brand: MEDLINE

## (undated) DEVICE — GLV SURG PREMIERPRO MIC LTX PF SZ7.5 BRN

## (undated) DEVICE — PREMIUM WET SKIN PREP TRAY: Brand: MEDLINE INDUSTRIES, INC.

## (undated) DEVICE — SHEET,DRAPE,53X77,STERILE: Brand: MEDLINE

## (undated) DEVICE — BG RESUSCITATOR INFANT BABYBLUE2

## (undated) DEVICE — ANTIBACTERIAL UNDYED BRAIDED (POLYGLACTIN 910), SYNTHETIC ABSORBABLE SUTURE: Brand: COATED VICRYL

## (undated) DEVICE — DRAPE,U/ SHT,SPLIT,PLAS,STERIL: Brand: MEDLINE

## (undated) DEVICE — DRSNG PAD ABD 8X10IN STRL

## (undated) DEVICE — ADHS SKIN DERMABOND TOPICAL HI VISC

## (undated) DEVICE — SUT ETHLN 2/0 FS 18IN 664H

## (undated) DEVICE — TBG PENCL TELESCP MEGADYNE SMOKE EVAC 10FT

## (undated) DEVICE — SUT SILK 2/0 TIES 30IN SA85H

## (undated) DEVICE — SPNG GZ WOVN 4X4IN 12PLY 10/BX STRL

## (undated) DEVICE — BNDG ELAS ELITE V/CLOSE 4IN 5YD LF STRL

## (undated) DEVICE — CATH IV ANGIOCATH/AUTOGARD 18G 1.75IN GRN

## (undated) DEVICE — HYDROGEL COATED LATEX URINE METER FOLEY TRAY,16 FR/CH (5.3 MM), 5 ML CATHETER PRE-CONNECTED TO 2000 ML DRAINAGE BAG WITH NEEDLE SAMPLING: Brand: DOVER

## (undated) DEVICE — BANDAGE,GAUZE,BULKEE II,4.5"X4.1YD,STRL: Brand: MEDLINE

## (undated) DEVICE — PATIENT RETURN ELECTRODE, SINGLE-USE, CONTACT QUALITY MONITORING, ADULT, WITH 9FT CORD, FOR PATIENTS WEIGING OVER 33LBS. (15KG): Brand: MEGADYNE

## (undated) DEVICE — UNDERGLV SURG BIOGEL INDICAT PF 8 GRN

## (undated) DEVICE — DRAPE,LAPAROTOMY,PED,STERILE: Brand: MEDLINE

## (undated) DEVICE — PK C/SECT 70

## (undated) DEVICE — PK BASIC 70

## (undated) DEVICE — GLV SURG PREMIERPRO MIC LTX PF SZ6.5 BRN

## (undated) DEVICE — GLV SURG PREMIERPRO MIC LTX PF SZ8 BRN